# Patient Record
Sex: FEMALE | Race: BLACK OR AFRICAN AMERICAN | HISPANIC OR LATINO | Employment: UNEMPLOYED | URBAN - METROPOLITAN AREA
[De-identification: names, ages, dates, MRNs, and addresses within clinical notes are randomized per-mention and may not be internally consistent; named-entity substitution may affect disease eponyms.]

---

## 2017-02-02 ENCOUNTER — HOSPITAL ENCOUNTER (OUTPATIENT)
Dept: ULTRASOUND IMAGING | Facility: CLINIC | Age: 27
Discharge: HOME | End: 2017-02-02
Payer: COMMERCIAL

## 2017-02-02 DIAGNOSIS — B18.1 HEPATITIS B CARRIER (H): ICD-10-CM

## 2017-02-02 PROCEDURE — 76700 US EXAM ABDOM COMPLETE: CPT

## 2017-02-02 PROCEDURE — T1013 SIGN LANG/ORAL INTERPRETER: HCPCS | Mod: U3

## 2019-07-31 ENCOUNTER — OFFICE VISIT (OUTPATIENT)
Dept: FAMILY MEDICINE | Facility: CLINIC | Age: 29
End: 2019-07-31
Payer: COMMERCIAL

## 2019-07-31 VITALS
WEIGHT: 137.8 LBS | RESPIRATION RATE: 16 BRPM | DIASTOLIC BLOOD PRESSURE: 62 MMHG | BODY MASS INDEX: 22.14 KG/M2 | TEMPERATURE: 98.3 F | HEIGHT: 66 IN | HEART RATE: 63 BPM | SYSTOLIC BLOOD PRESSURE: 95 MMHG

## 2019-07-31 DIAGNOSIS — Z01.00 ENCOUNTER FOR VISUAL TESTING: Primary | ICD-10-CM

## 2019-07-31 DIAGNOSIS — Z00.00 ROUTINE HISTORY AND PHYSICAL EXAMINATION OF ADULT: ICD-10-CM

## 2019-07-31 PROCEDURE — 99202 OFFICE O/P NEW SF 15 MIN: CPT | Performed by: FAMILY MEDICINE

## 2019-07-31 ASSESSMENT — ENCOUNTER SYMPTOMS
CHILLS: 0
FREQUENCY: 0
PALPITATIONS: 0
JOINT SWELLING: 0
NAUSEA: 0
FEVER: 0
HEMATURIA: 0
MYALGIAS: 0
HEADACHES: 0
HEMATOCHEZIA: 0
DIZZINESS: 0
EYE PAIN: 0
CONSTIPATION: 0
WEAKNESS: 0
DIARRHEA: 0
DYSURIA: 0
SORE THROAT: 0
ARTHRALGIAS: 0
ABDOMINAL PAIN: 0
HEARTBURN: 0
PARESTHESIAS: 0
BREAST MASS: 0
COUGH: 0
NERVOUS/ANXIOUS: 0
SHORTNESS OF BREATH: 0

## 2019-07-31 ASSESSMENT — MIFFLIN-ST. JEOR: SCORE: 1356.81
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
## 2019-07-31 NOTE — PATIENT INSTRUCTIONS
- Make Appointment with Optometrist to have an exam done    Appointment in 1 month to have full physical exam.   - You are due for a pap smear this should be done every 3 years
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
## 2019-07-31 NOTE — PROGRESS NOTES
"   SUBJECTIVE:   CC: Wilfred Barros is an 29 year old woman who presents for preventive health visit.     Healthy Habits:     Getting at least 3 servings of Calcium per day:  Yes    Bi-annual eye exam:  Yes    Dental care twice a year:  Yes    Sleep apnea or symptoms of sleep apnea:  None    Diet:  Regular (no restrictions)    Frequency of exercise:  1 day/week    Duration of exercise:  30-45 minutes    Taking medications regularly:  Yes    PHQ-2 Total Score: 0    Additional concerns today:  No    {Add if <65 person on Medicare  - Required Questions (Optional):388624}  {Outside tests to abstract? :274362}    {additional problems to add (Optional):290214}    Today's PHQ-2 Score:   PHQ-2 ( 1999 [employer]) 7/31/2019   Q1: Little interest or pleasure in doing things 0   Q2: Feeling down, depressed or hopeless 0   PHQ-2 Score 0   Q1: Little interest or pleasure in doing things Not at all   Q2: Feeling down, depressed or hopeless Not at all   PHQ-2 Score 0       Abuse: Current or Past(Physical, Sexual or Emotional)- { :043465}  Do you feel safe in your environment? { :643551}    Social History     Tobacco Use     Smoking status: Not on file   Substance Use Topics     Alcohol use: Not on file     {Rooming Staff- Complete this question if Prescreen response is not shown below for today's visit. If you drink alcohol do you typically have >3 drinks per day or >7 drinks per week? (Optional):576979}    Alcohol Use 7/31/2019   Prescreen: >3 drinks/day or >7 drinks/week? No   {add AUDIT responses (Optional) (A score of 7 for adult men is an indication of hazardous drinking; a score of 8 or more is an indication of an alcohol use disorder.  A score of 7 or more for adult women is an indication of hazardous drinking or an alchohol use disorder):347072}    Reviewed orders with patient.  Reviewed health maintenance and updated orders accordingly - { :195801::\"Yes\"}  {Chronicprobdata (optional):277434}    {Mammo Decision Support " "(Optional):019111}    Pertinent mammograms are reviewed under the imaging tab.  History of abnormal Pap smear: { :171269}     Reviewed and updated as needed this visit by clinical staff         Reviewed and updated as needed this visit by Provider        {HISTORY OPTIONS (Optional):670103}    Review of Systems   Constitutional: Negative for chills and fever.   HENT: Negative for congestion, ear pain, hearing loss and sore throat.    Eyes: Negative for pain and visual disturbance.   Respiratory: Negative for cough and shortness of breath.    Cardiovascular: Negative for chest pain, palpitations and peripheral edema.   Gastrointestinal: Negative for abdominal pain, constipation, diarrhea, heartburn, hematochezia and nausea.   Breasts:  Negative for tenderness, breast mass and discharge.   Genitourinary: Negative for dysuria, frequency, genital sores, hematuria, pelvic pain, urgency, vaginal bleeding and vaginal discharge.   Musculoskeletal: Negative for arthralgias, joint swelling and myalgias.   Skin: Negative for rash.   Neurological: Negative for dizziness, weakness, headaches and paresthesias.   Psychiatric/Behavioral: Negative for mood changes. The patient is not nervous/anxious.      {FEMALE ROS (Optional):151094}     OBJECTIVE:   There were no vitals taken for this visit.  Physical Exam  {Exam Choices (Optional):493011}    {Diagnostic Test Results (Optional):407759::\"Diagnostic Test Results:\",\"Labs reviewed in Epic\"}    ASSESSMENT/PLAN:   {Diag Picklist:242235}    COUNSELING:  {FEMALE COUNSELING MESSAGES:231324::\"Reviewed preventive health counseling, as reflected in patient instructions\"}    There is no height or weight on file to calculate BMI.    {Weight Management Plan (ACO) Complete if BMI is abnormal-  Ages 18-64  BMI >24.9.  Age 65+ with BMI <23 or >30 (Optional):534711}     has no tobacco history on file.  {Tobacco Cessation -- Complete if patient is a smoker (Optional):127157}    Counseling " Resources:  ATP IV Guidelines  Pooled Cohorts Equation Calculator  Breast Cancer Risk Calculator  FRAX Risk Assessment  ICSI Preventive Guidelines  Dietary Guidelines for Americans, 2010  USDA's MyPlate  ASA Prophylaxis  Lung CA Screening    Bryson Wilson MD  [facility]

## 2019-12-08 ENCOUNTER — APPOINTMENT (OUTPATIENT)
Dept: ULTRASOUND IMAGING | Facility: CLINIC | Age: 29
End: 2019-12-08
Attending: EMERGENCY MEDICINE
Payer: COMMERCIAL

## 2019-12-08 ENCOUNTER — HOSPITAL ENCOUNTER (EMERGENCY)
Facility: CLINIC | Age: 29
Discharge: HOME | End: 2019-12-08
Attending: EMERGENCY MEDICINE | Admitting: EMERGENCY MEDICINE
Payer: COMMERCIAL

## 2019-12-08 VITALS
HEART RATE: 69 BPM | WEIGHT: 143.4 LBS | SYSTOLIC BLOOD PRESSURE: 97 MMHG | TEMPERATURE: 97.1 F | BODY MASS INDEX: 23.15 KG/M2 | OXYGEN SATURATION: 100 % | DIASTOLIC BLOOD PRESSURE: 45 MMHG | RESPIRATION RATE: 12 BRPM

## 2019-12-08 DIAGNOSIS — N93.9 VAGINAL BLEEDING: ICD-10-CM

## 2019-12-08 DIAGNOSIS — R10.2 PELVIC PAIN IN FEMALE: ICD-10-CM

## 2019-12-08 LAB
SPECIMEN SOURCE: NORMAL
WET PREP SPEC: NORMAL

## 2019-12-08 PROCEDURE — 87591 N.GONORRHOEAE DNA AMP PROB: CPT | Performed by: EMERGENCY MEDICINE

## 2019-12-08 PROCEDURE — 87491 CHLMYD TRACH DNA AMP PROBE: CPT | Performed by: EMERGENCY MEDICINE

## 2019-12-08 PROCEDURE — 87210 SMEAR WET MOUNT SALINE/INK: CPT | Performed by: EMERGENCY MEDICINE

## 2019-12-08 PROCEDURE — 25000132 ZZH RX MED GY IP 250 OP 250 PS 637: Performed by: EMERGENCY MEDICINE

## 2019-12-08 PROCEDURE — 99284 EMERGENCY DEPT VISIT MOD MDM: CPT | Mod: Z6 | Performed by: EMERGENCY MEDICINE

## 2019-12-08 PROCEDURE — 99284 EMERGENCY DEPT VISIT MOD MDM: CPT | Mod: 25

## 2019-12-08 PROCEDURE — 93976 VASCULAR STUDY: CPT

## 2019-12-08 RX ORDER — NORETHINDRONE ACETATE AND ETHINYL ESTRADIOL .03; 1.5 MG/1; MG/1
TABLET ORAL
COMMUNITY
Start: 2019-11-22

## 2019-12-08 RX ORDER — IBUPROFEN 600 MG/1
600 TABLET, FILM COATED ORAL ONCE
Status: COMPLETED | OUTPATIENT
Start: 2019-12-08 | End: 2019-12-08

## 2019-12-08 RX ORDER — COPPER 313.4 MG/1
1 INTRAUTERINE DEVICE INTRAUTERINE
COMMUNITY
Start: 2019-09-18 | End: 2029-09-15

## 2019-12-08 RX ADMIN — IBUPROFEN 600 MG: 600 TABLET ORAL at 10:16

## 2019-12-08 ASSESSMENT — ENCOUNTER SYMPTOMS
ABDOMINAL PAIN: 1
SHORTNESS OF BREATH: 0
CHOKING: 0
FEVER: 0
VOMITING: 0
DYSURIA: 0
NAUSEA: 1

## 2019-12-08 NOTE — ED TRIAGE NOTES
Extreme abdominal pain for past four days. Went to urgent care yesterday 0800; gave Ibuprofen.  Last night large 1900 large blood clot was removed from body and continues to have extremely abdominal pain. Pt has video/photos of clot removed.

## 2019-12-08 NOTE — ED PROVIDER NOTES
"    [facility] EMERGENCY DEPARTMENT ([facility])    19      History     Chief Complaint   Patient presents with     Abdominal Pain     HPI  Wilfred rivera is an otherwise healthy 29-year-old female who is S/P  section who presents to the Emergency Department for evaluation of abdominal pain.  Patient states that her menstrual cycle began 3 days ago, and that is when her abdominal pain started.  Patient reports a \"large\" blood clot coming out last night.  Other than the clot, the patient only complains of very light vaginal bleeding compared to her other periods.  Patient also states that she has been experiencing some lower diffuse abdominal pain.  Patient describes this as a \"cramping\" pain below the bellybutton.  Patient reports that her usual periods began the - of the month, but it was late and lighter than usual.  Patient also reports history of recent ultrasound with a finding of an ovarian cyst (right ovary).  The patient was prescribed birth control (oral) because of the cyst.  Patient states that she still has her IUD in as well.  The patient reports taking this birth control for the last 10 days.  Patient reports her current pain is a \"7-8 out of 10\" and states she took 1 pill of ibuprofen at 6 AM this morning.  Denies any dysuria, chest pain, shortness of breath, vomiting.  Patient does endorse nausea.  Patient also denies any history of appendectomy or cholecystectomy.  Patient had a negative pregnancy test at urgent care yesterday as well as negative urinalysis.    The patient's most recent ultrasound was on 2019 with the comments and results posted below.    GYN Ultrasound  Impression:  Normal appearing uterus with endometrial cavity measuring     12.8 mm.  IUD is seen properly positioned within the uterine cavity.      Right ovary again has two echogenic, ground-glass appearing areas with no     blood flow by color Doppler.  The larger of the echogenic areas may be   "   slightly bigger than that measured on the ultrasound dated September 18th, 2019. Also, the overall size of the right ovary is slightly larger.  Left     ovary is within normal limits.          Plan: These findings were reviewed with the patient. She will follow up     with referring provider.      History reviewed. No pertinent past medical history.    History reviewed. No pertinent surgical history.    No family history on file.    Social History     Tobacco Use     Smoking status: Not on file     Smokeless tobacco: Never Used   Substance Use Topics     Alcohol use: Not Currently       No current facility-administered medications for this encounter.      Current Outpatient Medications   Medication     norethindrone-ethinyl estradiol (MICROGESTIN 1.5/30) 1.5-30 MG-MCG tablet     paragard intrauterine copper device      No Known Allergies    I have reviewed the Medications, Allergies, Past Medical and Surgical History, and Social History in the Epic system.    Review of Systems   Constitutional: Negative for fever.   Respiratory: Negative for choking and shortness of breath.    Cardiovascular: Negative for chest pain.   Gastrointestinal: Positive for abdominal pain and nausea. Negative for vomiting.   Genitourinary: Positive for vaginal bleeding. Negative for dysuria.   Skin: Negative for rash.       Physical Exam     ED Triage Vitals [12/08/19 0931]   Enc Vitals Group      BP 97/45      Pulse 69      Resp 12      Temp 97.1  F (36.2  C)      Temp src Oral      SpO2 100 %      Weight 65 kg (143 lb 6.4 oz)      Height          Physical Exam  GEN:  Well developed, no acute distress  HEENT:  EOMI, Mucous membranes are moist.   Cardio:  RRR, no murmur  PULM:  Lungs clear, good air movement, no wheezes, rales  Abd:  Soft, normal bowel sounds, there is suprapubic tenderness, no percussion tenderness  Pelvic Exam: There is a small amount of blood seen in the vagina, there appears to be a dark tissue like substance on  the cervix that I was not able to remove with ringed forceps.  IUD strings are seen.  There is also a foul smell with speculum exam, no discharge.    Musculoskeletal:  normal range of motion, no lower extremity swelling or calf tenderness  Neuro:  Alert and oriented X3, Follows commands, moving all extremities spontaneously   Skin:  Warm, dry    ED Course   10:03 AM  The patient was seen and examined by Jennifer Durbin MD in Room ED05.        Procedures           Critical Care time:  none  She was given ibuprofen for pain.     All labs are normal except as shown  Labs Ordered and Resulted from Time of ED Arrival Up to the Time of Departure from the ED   WET PREP   NEISSERIA GONORRHOEAE PCR   CHLAMYDIA TRACHOMATIS PCR     Results for orders placed or performed during the hospital encounter of 12/08/19 (from the past 24 hour(s))   Wet prep   Result Value Ref Range    Specimen Description Cervix Vagina     Wet Prep Few  PMNs seen       Wet Prep No motile Trichomonas seen     Wet Prep No yeast seen     Wet Prep No clue cells seen    US Pelvis Cmplt w Transvag & Doppler LmtPel Duplex Limited    Narrative    US PELVIS COMPLETE WITH TRANSVAGINAL AND DOPPLER LIMITED    12/8/2019  11:32 AM     HISTORY: Pelvic pain.    TECHNIQUE: Transvaginal imaging was added to the transabdominal scans.    COMPARISON: None.    FINDINGS: The uterus is measured at 9.3 x 5.7 x 4.6 cm. No fibroids  are evident. IUD appears to be in good position within the  endometrium. Endometrial stripe is not well seen due to the presence  of the IUD, but appears thickened where visualized, measuring up to 2  cm. The right ovary contains two isoechoic probable complex cystic  lesions, with the largest measuring 2.8 cm. The left ovary is  unremarkable. No solid adnexal masses are identified. Small amount of  anechoic free pelvic fluid is present. Color Doppler blood flow is  noted within the ovaries bilaterally.      Impression    IMPRESSION:   1. IUD  appears to be in good position within the endometrium.  2. Endometrium appears thickened at 2 cm.    3. Two complex cystic lesions are noted in the right ovary, with the  largest measuring 2.8 cm. Follow-up ultrasound in 6-12 weeks may be  helpful to ensure resolution  4. Small amount of free fluid in the pelvis is nonspecific, and may be  physiologic.     The patient's physical exam findings on speculum exam were discussed with gynecology over the phone.  Given that she had a normal Pap smear in 2018, the did not recommend any further testing today other than the ultrasound of the pelvis for pain.  Patient was given ibuprofen in the ED and had improvement in her pain.    Assessments & Plan (with Medical Decision Making)   Patient presents with lower abdominal pain and vaginal bleeding.  Speculum exam revealed a tissue like substance over the cervix that I could not remove.  Not sure what the tissue is.  She had a negative pregnancy test yesterday.  Ultrasound is revealing a similar size of the right ovarian cyst as compared to previous ultrasound.  No sign of abscess or ovarian torsion.  Patient is otherwise stable, pain is improved, abdominal exam is not concerning for surgical abdomen.  She was advised to take ibuprofen as needed for her pain and follow-up with her gynecologist for reevaluation and for continued treatment.  I also advised the patient to return to the emergency department if she has fever, vomiting, worsening pain or any other concerns.    I have reviewed the nursing notes.    I have reviewed the findings, diagnosis, plan and need for follow up with the patient.    Discharge Medication List as of 12/8/2019  1:01 PM          Final diagnoses:   Pelvic pain in female   Vaginal bleeding   ILars, am serving as a trained medical scribe to document services personally performed by Jennifer Durbin MD, based on the provider's statements to me.      Jennifer CARDONA MD, was physically  present and have reviewed and verified the accuracy of this note documented by Lars Seals.     12/8/2019   [facility], [place], EMERGENCY DEPARTMENT     Jennifer Durbin MD  12/08/19 6083

## 2019-12-08 NOTE — ED TRIAGE NOTES
Pt started birth control 10 days ago. Was informed to start when period began.  Saw some spotting 3 days ago and began birth control.

## 2019-12-08 NOTE — DISCHARGE INSTRUCTIONS
Please make an appointment to follow up with your gynecologist in 3-4 days for further evaluation and treatment.  Continue Ibuprofen and Tylenol if needed for pain. Return to the Emergency Department if you have fever, vomiting, worsening pain or other concerns.

## 2019-12-08 NOTE — ED AVS SNAPSHOT
[facility], [place], Emergency Department  [address]  [facility] [address]  Phone:  [phone]  Fax:  [phone]                                    Wilfred Barros   MRN: [id number]    Department:  [facility], Emergency Department   Date of Visit:  12/8/2019           After Visit Summary Signature Page    I have received my discharge instructions, and my questions have been answered. I have discussed any challenges I see with this plan with the nurse or doctor.    ..........................................................................................................................................  Patient/Patient Representative Signature      ..........................................................................................................................................  Patient Representative Print Name and Relationship to Patient    ..................................................               ................................................  Date                                   Time    ..........................................................................................................................................  Reviewed by Signature/Title    ...................................................              ..............................................  Date                                               Time          22EPIC Rev 08/18

## 2019-12-09 LAB
C TRACH DNA SPEC QL NAA+PROBE: NEGATIVE
N GONORRHOEA DNA SPEC QL NAA+PROBE: NEGATIVE
SPECIMEN SOURCE: NORMAL
SPECIMEN SOURCE: NORMAL

## 2019-12-09 NOTE — RESULT ENCOUNTER NOTE
Final result for both N. Gonorrhoeae PCR and Chlamydia Trachomatis PCR are NEGATIVE.  No treatment or change in treatment per [place] ED Lab Result protocol.

## 2022-11-08 ENCOUNTER — HOSPITAL ENCOUNTER (EMERGENCY)
Facility: CLINIC | Age: 32
Discharge: HOME | End: 2022-11-08
Attending: EMERGENCY MEDICINE | Admitting: EMERGENCY MEDICINE
Payer: COMMERCIAL

## 2022-11-08 VITALS
TEMPERATURE: 98.7 F | SYSTOLIC BLOOD PRESSURE: 103 MMHG | HEIGHT: 66 IN | WEIGHT: 142 LBS | BODY MASS INDEX: 22.82 KG/M2 | HEART RATE: 83 BPM | DIASTOLIC BLOOD PRESSURE: 60 MMHG | OXYGEN SATURATION: 100 % | RESPIRATION RATE: 16 BRPM

## 2022-11-08 DIAGNOSIS — O21.0 HYPEREMESIS GRAVIDARUM: ICD-10-CM

## 2022-11-08 LAB
ALBUMIN UR-MCNC: NEGATIVE MG/DL
ANION GAP SERPL CALCULATED.3IONS-SCNC: 9 MMOL/L (ref 3–14)
APPEARANCE UR: CLEAR
BACTERIA #/AREA URNS HPF: ABNORMAL /HPF
BILIRUB UR QL STRIP: NEGATIVE
BUN SERPL-MCNC: 8 MG/DL (ref 7–30)
CALCIUM SERPL-MCNC: 10.3 MG/DL (ref 8.5–10.1)
CHLORIDE BLD-SCNC: 101 MMOL/L (ref 94–109)
CLUE CELLS: ABNORMAL
CO2 SERPL-SCNC: 26 MMOL/L (ref 20–32)
COLOR UR AUTO: ABNORMAL
CREAT SERPL-MCNC: 0.5 MG/DL (ref 0.52–1.04)
GFR SERPL CREATININE-BSD FRML MDRD: >90 ML/MIN/1.73M2
GLUCOSE BLD-MCNC: 96 MG/DL (ref 70–99)
GLUCOSE UR STRIP-MCNC: >=1000 MG/DL
HGB UR QL STRIP: NEGATIVE
KETONES UR STRIP-MCNC: 10 MG/DL
LEUKOCYTE ESTERASE UR QL STRIP: NEGATIVE
NITRATE UR QL: NEGATIVE
PH UR STRIP: 6 [PH] (ref 5–7)
POTASSIUM BLD-SCNC: 3.7 MMOL/L (ref 3.4–5.3)
RBC URINE: 1 /HPF
SODIUM SERPL-SCNC: 136 MMOL/L (ref 133–144)
SP GR UR STRIP: 1.03 (ref 1–1.03)
SQUAMOUS EPITHELIAL: 13 /HPF
TRICHOMONAS, WET PREP: ABNORMAL
UROBILINOGEN UR STRIP-MCNC: NORMAL MG/DL
WBC URINE: 5 /HPF
WBC'S/HIGH POWER FIELD, WET PREP: ABNORMAL
YEAST, WET PREP: PRESENT

## 2022-11-08 PROCEDURE — 250N000011 HC RX IP 250 OP 636: Performed by: EMERGENCY MEDICINE

## 2022-11-08 PROCEDURE — 99284 EMERGENCY DEPT VISIT MOD MDM: CPT | Mod: 25 | Performed by: EMERGENCY MEDICINE

## 2022-11-08 PROCEDURE — 258N000003 HC RX IP 258 OP 636: Performed by: EMERGENCY MEDICINE

## 2022-11-08 PROCEDURE — 99284 EMERGENCY DEPT VISIT MOD MDM: CPT | Performed by: EMERGENCY MEDICINE

## 2022-11-08 PROCEDURE — 250N000011 HC RX IP 250 OP 636

## 2022-11-08 PROCEDURE — 36415 COLL VENOUS BLD VENIPUNCTURE: CPT | Performed by: EMERGENCY MEDICINE

## 2022-11-08 PROCEDURE — 96374 THER/PROPH/DIAG INJ IV PUSH: CPT | Performed by: EMERGENCY MEDICINE

## 2022-11-08 PROCEDURE — 87210 SMEAR WET MOUNT SALINE/INK: CPT | Performed by: EMERGENCY MEDICINE

## 2022-11-08 PROCEDURE — 96361 HYDRATE IV INFUSION ADD-ON: CPT | Performed by: EMERGENCY MEDICINE

## 2022-11-08 PROCEDURE — 80048 BASIC METABOLIC PNL TOTAL CA: CPT | Performed by: EMERGENCY MEDICINE

## 2022-11-08 PROCEDURE — 81001 URINALYSIS AUTO W/SCOPE: CPT | Performed by: EMERGENCY MEDICINE

## 2022-11-08 RX ORDER — CLOTRIMAZOLE 1 %
1 CREAM WITH APPLICATOR VAGINAL AT BEDTIME
Qty: 45 G | Refills: 0 | Status: SHIPPED | OUTPATIENT
Start: 2022-11-08 | End: 2022-11-15

## 2022-11-08 RX ORDER — ONDANSETRON 2 MG/ML
8 INJECTION INTRAMUSCULAR; INTRAVENOUS ONCE
Status: COMPLETED | OUTPATIENT
Start: 2022-11-08 | End: 2022-11-08

## 2022-11-08 RX ORDER — ONDANSETRON 4 MG/1
4 TABLET, ORALLY DISINTEGRATING ORAL EVERY 8 HOURS PRN
Qty: 20 TABLET | Refills: 3 | Status: SHIPPED | OUTPATIENT
Start: 2022-11-08

## 2022-11-08 RX ORDER — PNV NO.95/FERROUS FUM/FOLIC AC 28MG-0.8MG
1 TABLET ORAL DAILY
COMMUNITY
Start: 2022-01-31

## 2022-11-08 RX ORDER — ONDANSETRON 2 MG/ML
INJECTION INTRAMUSCULAR; INTRAVENOUS
Status: COMPLETED
Start: 2022-11-08 | End: 2022-11-08

## 2022-11-08 RX ADMIN — DEXTROSE AND SODIUM CHLORIDE: 5; 900 INJECTION, SOLUTION INTRAVENOUS at 10:41

## 2022-11-08 RX ADMIN — ONDANSETRON 8 MG: 2 INJECTION INTRAMUSCULAR; INTRAVENOUS at 10:41

## 2022-11-08 RX ADMIN — DEXTROSE AND SODIUM CHLORIDE: 5; 900 INJECTION, SOLUTION INTRAVENOUS at 12:03

## 2022-11-08 RX ADMIN — ONDANSETRON 4 MG: 2 INJECTION INTRAMUSCULAR; INTRAVENOUS at 12:04

## 2022-11-08 ASSESSMENT — ENCOUNTER SYMPTOMS
APPETITE CHANGE: 1
EYE REDNESS: 0
SHORTNESS OF BREATH: 0
CONFUSION: 0
COLOR CHANGE: 0
FEVER: 0
ARTHRALGIAS: 0
ABDOMINAL PAIN: 1
FATIGUE: 1
HEADACHES: 0
VOMITING: 1
DIFFICULTY URINATING: 0
NAUSEA: 1
NECK STIFFNESS: 0

## 2022-11-08 ASSESSMENT — ACTIVITIES OF DAILY LIVING (ADL)
ADLS_ACUITY_SCORE: 35
ADLS_ACUITY_SCORE: 35

## 2022-11-08 NOTE — DISCHARGE INSTRUCTIONS
Please make an appointment to follow up with your OB clinic.    Zofran, as directed, if needed for nausea and/or vomiting.    Try to eat small, frequent meals.    Clotrimazole vaginal cream for vaginal yeast infection.    Return to the emergency department for any problems.

## 2022-11-08 NOTE — ED TRIAGE NOTES
Patient presents 9 weeks pregnant with hyperemesis. Patient has been unable to eat or drink anything. Patient reports symptoms for the last month, worsening over the last 3 days. Patient reports 9/10 burning pain due to vomiting.      Triage Assessment     Row Name 11/08/22 1020       Triage Assessment (Adult)    Airway WDL WDL       Respiratory WDL    Respiratory WDL WDL       Skin Circulation/Temperature WDL    Skin Circulation/Temperature WDL WDL       Cardiac WDL    Cardiac WDL X;chest pain       Chest Pain Assessment    Chest Pain Location midsternal    Chest Pain Radiation abdomen    Character burning    Precipitating Factors at rest    Associated Signs/Symptoms nausea;vomiting    Chest Pain Intervention 12-lead ECG obtained       Peripheral/Neurovascular WDL    Peripheral Neurovascular WDL WDL

## 2022-11-08 NOTE — ED PROVIDER NOTES
[facility] EMERGENCY DEPARTMENT ([facility])    22          History     Chief Complaint   Patient presents with     Nausea & Vomiting     Patient presents 9 weeks pregnant with hyperemesis. Patient has been unable to eat or drink anything. Patient reports symptoms for the last month, worsening over the last 3 days.      The history is provided by the patient and medical records.     Wilfred Barros is a 32 year old  pregnant (approximately 9 weeks gestation) female with past medical history significant for chronic hepatitis B, pituitary tumor s/p resection, s/p  who presents to the ED for evaluation of nausea and vomiting for 3 days.  The patient reports that this is her fourth pregnancy and she has 3 children at home.  She did not have hyperemesis with previous pregnancies.  She reports she is not on nausea medications.  She reports she has not been able to eat much in the last 4 days.  She also feels burning epigastric abdominal pain and fatigue.  She has a virtual appointment with her OB/GYN tomorrow.  Patient also does complain of vaginal itching and a whitish vaginal discharge without pain.    Past Medical History  History reviewed. No pertinent past medical history.  History reviewed. No pertinent surgical history.  cholecalciferol (VITAMIN D3) 10 mcg (400 units) TABS tablet  clotrimazole (LOTRIMIN) 1 % vaginal cream  norethindrone-ethinyl estradiol (MICROGESTIN 1.5/30) 1.5-30 MG-MCG tablet  ondansetron (ZOFRAN ODT) 4 MG ODT tab  paragard intrauterine copper device  Prenatal Vit-Fe Fumarate-FA (PRENATAL VITAMINS) 28-0.8 MG TABS      Allergies   Allergen Reactions     Metronidazole Angioedema and Swelling     Also dizzy and hard to swallow  Other reaction(s): Swelling of Face,throat,lips or tongue/Angioedema - Allergy  Also dizzy and hard to swallow  Also dizzy and hard to swallow  Also dizzy and hard to swallow       Blood-Group Specific Substance Other (See Comments)     Patient  "has a probable passive anti-D antibody. Blood products may be delayed. Draw patient 24 hours prior to transfusion. Draw one red top and two purple top tubes for all type and screen orders.  Patient has a probable passive anti-D antibody. Blood products may be delayed. Draw patient 24 hours prior to transfusion. Draw one red top and two purple top tubes for all type and screen orders.       Family History  History reviewed. No pertinent family history.  Social History   Social History     Tobacco Use     Smoking status: Never     Smokeless tobacco: Never   Substance Use Topics     Alcohol use: Not Currently     Drug use: Not Currently      Past medical history, past surgical history, medications, allergies, family history, and social history were reviewed with the patient. No additional pertinent items.       Review of Systems   Constitutional: Positive for appetite change and fatigue. Negative for fever.   HENT: Negative for congestion.    Eyes: Negative for redness.   Respiratory: Negative for shortness of breath.    Cardiovascular: Negative for chest pain.   Gastrointestinal: Positive for abdominal pain, nausea and vomiting.   Genitourinary: Negative for difficulty urinating.   Musculoskeletal: Negative for arthralgias and neck stiffness.   Skin: Negative for color change.   Neurological: Negative for headaches.   Psychiatric/Behavioral: Negative for confusion.   All other systems reviewed and are negative.    A complete review of systems was performed with pertinent positives and negatives noted in the HPI, and all other systems negative.    Physical Exam   BP: 113/75  Pulse: 80  Temp: 98.7  F (37.1  C)  Resp: 18  Height: 167.6 cm (5' 6\")  Weight: 64.4 kg (142 lb)  SpO2: 100 %  Physical Exam  Vitals and nursing note reviewed.   Constitutional:       General: She is not in acute distress.     Appearance: She is well-developed. She is not ill-appearing, toxic-appearing or diaphoretic.   HENT:      Head: " Normocephalic and atraumatic.      Mouth/Throat:      Lips: Pink.      Mouth: Mucous membranes are moist.      Pharynx: Oropharynx is clear. No oropharyngeal exudate.   Eyes:      General: Lids are normal. No scleral icterus.     Extraocular Movements: Extraocular movements intact.      Right eye: No nystagmus.      Left eye: No nystagmus.      Conjunctiva/sclera: Conjunctivae normal.      Pupils: Pupils are equal, round, and reactive to light.   Neck:      Thyroid: No thyromegaly.      Vascular: No JVD.      Trachea: No tracheal deviation.   Cardiovascular:      Rate and Rhythm: Normal rate and regular rhythm.      Pulses: Normal pulses.      Heart sounds: Normal heart sounds. No murmur heard.    No friction rub. No gallop.   Pulmonary:      Effort: Pulmonary effort is normal. No respiratory distress.      Breath sounds: Normal breath sounds.   Abdominal:      General: Bowel sounds are normal. There is no distension.      Palpations: Abdomen is soft. There is no mass.      Tenderness: There is no abdominal tenderness. There is no guarding or rebound.   Musculoskeletal:         General: No tenderness. Normal range of motion.      Cervical back: Normal range of motion and neck supple. No erythema or rigidity.      Right lower leg: No edema.      Left lower leg: No edema.   Lymphadenopathy:      Cervical: No cervical adenopathy.   Skin:     General: Skin is warm and dry.      Capillary Refill: Capillary refill takes less than 2 seconds.      Coloration: Skin is not pale.      Findings: No erythema or rash.   Neurological:      Mental Status: She is alert and oriented to person, place, and time.      Cranial Nerves: No cranial nerve deficit.      Sensory: No sensory deficit.      Motor: Motor function is intact.   Psychiatric:         Mood and Affect: Mood and affect normal.         Speech: Speech normal.         Behavior: Behavior normal.         ED Course     10:27 AM  The patient was seen and examined by Seth  Houston Ledbetter MD in Triage.     Procedures                     Results for orders placed or performed during the hospital encounter of 11/08/22   Basic metabolic panel     Status: Abnormal   Result Value Ref Range    Sodium 136 133 - 144 mmol/L    Potassium 3.7 3.4 - 5.3 mmol/L    Chloride 101 94 - 109 mmol/L    Carbon Dioxide (CO2) 26 20 - 32 mmol/L    Anion Gap 9 3 - 14 mmol/L    Urea Nitrogen 8 7 - 30 mg/dL    Creatinine 0.50 (L) 0.52 - 1.04 mg/dL    Calcium 10.3 (H) 8.5 - 10.1 mg/dL    Glucose 96 70 - 99 mg/dL    GFR Estimate >90 >60 mL/min/1.73m2   UA with Microscopic reflex to Culture     Status: Abnormal    Specimen: Urine, Clean Catch   Result Value Ref Range    Color Urine Light Yellow Colorless, Straw, Light Yellow, Yellow    Appearance Urine Clear Clear    Glucose Urine >=1000 (A) Negative mg/dL    Bilirubin Urine Negative Negative    Ketones Urine 10 (A) Negative mg/dL    Specific Gravity Urine 1.032 1.003 - 1.035    Blood Urine Negative Negative    pH Urine 6.0 5.0 - 7.0    Protein Albumin Urine Negative Negative mg/dL    Urobilinogen Urine Normal Normal, 2.0 mg/dL    Nitrite Urine Negative Negative    Leukocyte Esterase Urine Negative Negative    Bacteria Urine Few (A) None Seen /HPF    RBC Urine 1 <=2 /HPF    WBC Urine 5 <=5 /HPF    Squamous Epithelials Urine 13 (H) <=1 /HPF    Narrative    Urine Culture not indicated   Wet prep     Status: Abnormal    Specimen: Vagina; Swab   Result Value Ref Range    Trichomonas Absent Absent    Yeast Present (A) Absent    Clue Cells Absent Absent    WBCs/high power field 1+ (A) None     Medications   dextrose 5% and 0.9% NaCl infusion (0 mLs Intravenous Stopped 11/8/22 1331)   ondansetron (ZOFRAN) injection 8 mg (8 mg Intravenous Given 11/8/22 1041)   ondansetron (ZOFRAN) 2 MG/ML injection (4 mg  Given 11/8/22 1204)        Assessments & Plan (with Medical Decision Making)     Patient presented to the emergency department with nausea and vomiting in the setting of  early pregnancy.  This seems consistent with hyperemesis gravidarum.  Abdominal exam is benign, patient is afebrile and does not appear in acute distress all decreasing suspicion for intra-abdominal pathology.  She has no complaints that would suggest ectopic pregnancy or miscarriage.  She did complain of some vaginal itching and whitish discharge.  We had no rooms in the emergency department so I was unable to perform a pelvic exam on the patient in an appropriately private setting so patient did self swab and wet prep came back positive for yeast.  She will be treated with topical antifungals.  She did receive 2 L of IV fluid as well as Zofran and felt improved.  Abdominal exam remained benign.  Patient will be discharged with a prescription for Zofran and was asked to follow-up with an OB provider as soon as possible.    I have reviewed the nursing notes. I have reviewed the findings, diagnosis, plan and need for follow up with the patient.    New Prescriptions    CLOTRIMAZOLE (LOTRIMIN) 1 % VAGINAL CREAM    Place 1 Applicatorful vaginally At Bedtime for 7 days    ONDANSETRON (ZOFRAN ODT) 4 MG ODT TAB    Take 1 tablet (4 mg) by mouth every 8 hours as needed for nausea       Final diagnoses:   Hyperemesis gravidarum     I, Kavitha Alicia, am serving as a trained medical scribe to document services personally performed by Seth Ledbetter MD based on the provider's statements to me on November 8, 2022.  This document has been checked and approved by the attending provider.    ISeth MD, was physically present and have reviewed and verified the accuracy of this note documented by Kavitha Alicia, medical scribe.      --  Seth Ledbetter  [facility] EMERGENCY DEPARTMENT  11/8/2022     Seth Ledbetter MD  11/08/22 5511

## 2022-11-11 ENCOUNTER — HOSPITAL ENCOUNTER (EMERGENCY)
Facility: CLINIC | Age: 32
Discharge: HOME | End: 2022-11-11
Attending: FAMILY MEDICINE | Admitting: FAMILY MEDICINE
Payer: COMMERCIAL

## 2022-11-11 ENCOUNTER — APPOINTMENT (OUTPATIENT)
Dept: ULTRASOUND IMAGING | Facility: CLINIC | Age: 32
End: 2022-11-11
Attending: FAMILY MEDICINE
Payer: COMMERCIAL

## 2022-11-11 VITALS
DIASTOLIC BLOOD PRESSURE: 74 MMHG | TEMPERATURE: 98.7 F | SYSTOLIC BLOOD PRESSURE: 105 MMHG | RESPIRATION RATE: 16 BRPM | HEART RATE: 70 BPM | OXYGEN SATURATION: 100 %
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
## 2022-11-11 DIAGNOSIS — O21.9 NAUSEA AND VOMITING IN PREGNANCY: ICD-10-CM

## 2022-11-11 DIAGNOSIS — O20.9 BLEEDING IN EARLY PREGNANCY: ICD-10-CM

## 2022-11-11 LAB
ANION GAP SERPL CALCULATED.3IONS-SCNC: 10 MMOL/L (ref 3–14)
B-HCG SERPL-ACNC: ABNORMAL IU/L (ref 0–5)
BASOPHILS # BLD AUTO: 0 10E3/UL (ref 0–0.2)
BASOPHILS NFR BLD AUTO: 0 %
BUN SERPL-MCNC: 9 MG/DL (ref 7–30)
CALCIUM SERPL-MCNC: 9.1 MG/DL (ref 8.5–10.1)
CHLORIDE BLD-SCNC: 106 MMOL/L (ref 94–109)
CO2 SERPL-SCNC: 23 MMOL/L (ref 20–32)
CREAT SERPL-MCNC: 0.54 MG/DL (ref 0.52–1.04)
EOSINOPHIL # BLD AUTO: 0.1 10E3/UL (ref 0–0.7)
EOSINOPHIL NFR BLD AUTO: 1 %
ERYTHROCYTE [DISTWIDTH] IN BLOOD BY AUTOMATED COUNT: 11.9 % (ref 10–15)
GFR SERPL CREATININE-BSD FRML MDRD: >90 ML/MIN/1.73M2
GLUCOSE BLD-MCNC: 88 MG/DL (ref 70–99)
HCT VFR BLD AUTO: 35.7 % (ref 35–47)
HGB BLD-MCNC: 12.7 G/DL (ref 11.7–15.7)
IMM GRANULOCYTES # BLD: 0 10E3/UL
IMM GRANULOCYTES NFR BLD: 0 %
LYMPHOCYTES # BLD AUTO: 2.4 10E3/UL (ref 0.8–5.3)
LYMPHOCYTES NFR BLD AUTO: 41 %
MCH RBC QN AUTO: 31.1 PG (ref 26.5–33)
MCHC RBC AUTO-ENTMCNC: 35.6 G/DL (ref 31.5–36.5)
MCV RBC AUTO: 87 FL (ref 78–100)
MONOCYTES # BLD AUTO: 0.7 10E3/UL (ref 0–1.3)
MONOCYTES NFR BLD AUTO: 11 %
NEUTROPHILS # BLD AUTO: 2.7 10E3/UL (ref 1.6–8.3)
NEUTROPHILS NFR BLD AUTO: 47 %
NRBC # BLD AUTO: 0 10E3/UL
NRBC BLD AUTO-RTO: 0 /100
PLATELET # BLD AUTO: 184 10E3/UL (ref 150–450)
POTASSIUM BLD-SCNC: 3.7 MMOL/L (ref 3.4–5.3)
RBC # BLD AUTO: 4.09 10E6/UL (ref 3.8–5.2)
SODIUM SERPL-SCNC: 139 MMOL/L (ref 133–144)
WBC # BLD AUTO: 5.8 10E3/UL (ref 4–11)

## 2022-11-11 PROCEDURE — 36415 COLL VENOUS BLD VENIPUNCTURE: CPT | Performed by: FAMILY MEDICINE

## 2022-11-11 PROCEDURE — 93005 ELECTROCARDIOGRAM TRACING: CPT | Performed by: FAMILY MEDICINE

## 2022-11-11 PROCEDURE — 80048 BASIC METABOLIC PNL TOTAL CA: CPT | Performed by: FAMILY MEDICINE

## 2022-11-11 PROCEDURE — 85025 COMPLETE CBC W/AUTO DIFF WBC: CPT | Performed by: FAMILY MEDICINE

## 2022-11-11 PROCEDURE — 76801 OB US < 14 WKS SINGLE FETUS: CPT

## 2022-11-11 PROCEDURE — 93010 ELECTROCARDIOGRAM REPORT: CPT | Performed by: FAMILY MEDICINE

## 2022-11-11 PROCEDURE — 258N000003 HC RX IP 258 OP 636: Performed by: FAMILY MEDICINE

## 2022-11-11 PROCEDURE — 84702 CHORIONIC GONADOTROPIN TEST: CPT | Performed by: FAMILY MEDICINE

## 2022-11-11 PROCEDURE — 96360 HYDRATION IV INFUSION INIT: CPT | Performed by: FAMILY MEDICINE

## 2022-11-11 PROCEDURE — 99285 EMERGENCY DEPT VISIT HI MDM: CPT | Mod: 25 | Performed by: FAMILY MEDICINE

## 2022-11-11 RX ORDER — SODIUM CHLORIDE, SODIUM LACTATE, POTASSIUM CHLORIDE, CALCIUM CHLORIDE 600; 310; 30; 20 MG/100ML; MG/100ML; MG/100ML; MG/100ML
125 INJECTION, SOLUTION INTRAVENOUS CONTINUOUS
Status: DISCONTINUED | OUTPATIENT
Start: 2022-11-11 | End: 2022-11-11 | Stop reason: HOSPADM

## 2022-11-11 RX ADMIN — SODIUM CHLORIDE, POTASSIUM CHLORIDE, SODIUM LACTATE AND CALCIUM CHLORIDE 1000 ML: 600; 310; 30; 20 INJECTION, SOLUTION INTRAVENOUS at 17:11

## 2022-11-11 ASSESSMENT — ACTIVITIES OF DAILY LIVING (ADL): ADLS_ACUITY_SCORE: 35

## 2022-11-11 NOTE — ED PROVIDER NOTES
[facility] [place] ([facility])     2022     History     Chief Complaint   Patient presents with     Vaginal Bleeding     Nausea & Vomiting     HPI  Wilfred Barros is a 32 year old 10 weeks pregnant  female with a past medical history including s/p , history of premature delivery, pituitary macroadenoma who presents to the Emergency Department for evaluation of vaginal bleeding, nausea, and vomiting.  The patient reports that yesterday, she was having bad morning sickness and started to feel dizzy.she has been fell and landed on her right hip.  Today, she has had vaginal bleeding along with some abdominal cramping and back pain.  She also complains of nausea and vomiting.  She states that she takes Zofran for her morning sickness.  She is able to ambulate.  Patient has had 3 prior pregnancies and has 3 living children.  She also states she was recently diagnosed with yeast vaginitis and is using cream.    Patient is known to be Rh+.      Past Medical History  No past medical history on file.  No past surgical history on file.  cholecalciferol (VITAMIN D3) 10 mcg (400 units) TABS tablet  clotrimazole (LOTRIMIN) 1 % vaginal cream  norethindrone-ethinyl estradiol (MICROGESTIN 1.5/30) 1.5-30 MG-MCG tablet  ondansetron (ZOFRAN ODT) 4 MG ODT tab  paragard intrauterine copper device  Prenatal Vit-Fe Fumarate-FA (PRENATAL VITAMINS) 28-0.8 MG TABS      Allergies   Allergen Reactions     Metronidazole Angioedema and Swelling     Also dizzy and hard to swallow  Other reaction(s): Swelling of Face,throat,lips or tongue/Angioedema - Allergy  Also dizzy and hard to swallow  Also dizzy and hard to swallow  Also dizzy and hard to swallow       Blood-Group Specific Substance Other (See Comments)     Patient has a probable passive anti-D antibody. Blood products may be delayed. Draw patient 24 hours prior to transfusion. Draw one red top and two purple top tubes for all type and screen  orders.  Patient has a probable passive anti-D antibody. Blood products may be delayed. Draw patient 24 hours prior to transfusion. Draw one red top and two purple top tubes for all type and screen orders.       Family History  No family history on file.  Social History   Social History     Tobacco Use     Smoking status: Never     Smokeless tobacco: Never   Substance Use Topics     Alcohol use: Not Currently     Drug use: Not Currently      Past medical history, past surgical history, medications, allergies, family history, and social history were reviewed with the patient. No additional pertinent items.       Review of Systems  A complete review of systems was performed with pertinent positives and negatives noted in the HPI, and all other systems negative.    ROS: 14 point ROS neg other than the symptoms noted above in the HPI.     Physical Exam   BP: 105/74  Pulse: 83  Temp: 98.1  F (36.7  C)  Resp: 16  SpO2: 99 %  Physical Exam  Vitals and nursing note reviewed.   Constitutional:       General: She is not in acute distress.     Appearance: She is not diaphoretic.   HENT:      Head: Atraumatic.      Mouth/Throat:      Pharynx: No oropharyngeal exudate.   Eyes:      General: No scleral icterus.     Pupils: Pupils are equal, round, and reactive to light.   Cardiovascular:      Heart sounds: Normal heart sounds.   Pulmonary:      Effort: No respiratory distress.      Breath sounds: Normal breath sounds.   Abdominal:      General: Bowel sounds are normal.      Palpations: Abdomen is soft.      Tenderness: There is no abdominal tenderness.   Musculoskeletal:         General: No tenderness.   Skin:     General: Skin is warm.      Findings: No rash.         ED Course     4:34 PM  The patient was seen and examined by Shahriar Cochran MD in Room ED05.    Procedures            EKG Interpretation:      Interpreted by Shahriar Cochran MD  Time reviewed: 1700  Symptoms at time of EKG: Dizzy  Rhythm: sinus bradycardia  Rate:  50-60  Axis: Normal  Ectopy: none  Conduction: normal  ST Segments/ T Waves: No ST-T wave changes and No acute ischemic changes  Q Waves: none  Comparison to prior: No old EKG available    Clinical Impression: normal EKG                      The medical record was reviewed and interpreted.  Current labs reviewed and interpreted.  Previous labs reviewed and interpreted.  Current images reviewed and interpreted: Pelvic ultrasound.  EKG reviewed and interpreted: Sinus bradycardia, rate 55 otherwise normal EKG.  Managed outpatient prescription medications.           Results for orders placed or performed during the hospital encounter of 11/11/22   Basic metabolic panel     Status: Normal   Result Value Ref Range    Sodium 139 133 - 144 mmol/L    Potassium 3.7 3.4 - 5.3 mmol/L    Chloride 106 94 - 109 mmol/L    Carbon Dioxide (CO2) 23 20 - 32 mmol/L    Anion Gap 10 3 - 14 mmol/L    Urea Nitrogen 9 7 - 30 mg/dL    Creatinine 0.54 0.52 - 1.04 mg/dL    Calcium 9.1 8.5 - 10.1 mg/dL    Glucose 88 70 - 99 mg/dL    GFR Estimate >90 >60 mL/min/1.73m2   CBC with platelets and differential     Status: None   Result Value Ref Range    WBC Count 5.8 4.0 - 11.0 10e3/uL    RBC Count 4.09 3.80 - 5.20 10e6/uL    Hemoglobin 12.7 11.7 - 15.7 g/dL    Hematocrit 35.7 35.0 - 47.0 %    MCV 87 78 - 100 fL    MCH 31.1 26.5 - 33.0 pg    MCHC 35.6 31.5 - 36.5 g/dL    RDW 11.9 10.0 - 15.0 %    Platelet Count 184 150 - 450 10e3/uL    % Neutrophils 47 %    % Lymphocytes 41 %    % Monocytes 11 %    % Eosinophils 1 %    % Basophils 0 %    % Immature Granulocytes 0 %    NRBCs per 100 WBC 0 <1 /100    Absolute Neutrophils 2.7 1.6 - 8.3 10e3/uL    Absolute Lymphocytes 2.4 0.8 - 5.3 10e3/uL    Absolute Monocytes 0.7 0.0 - 1.3 10e3/uL    Absolute Eosinophils 0.1 0.0 - 0.7 10e3/uL    Absolute Basophils 0.0 0.0 - 0.2 10e3/uL    Absolute Immature Granulocytes 0.0 <=0.4 10e3/uL    Absolute NRBCs 0.0 10e3/uL   EKG 12-lead, tracing only     Status: None  (Preliminary result)   Result Value Ref Range    Systolic Blood Pressure  mmHg    Diastolic Blood Pressure  mmHg    Ventricular Rate 57 BPM    Atrial Rate 57 BPM    NC Interval 150 ms    QRS Duration 86 ms     ms    QTc 385 ms    P Axis 62 degrees    R AXIS 38 degrees    T Axis 39 degrees    Interpretation ECG Sinus bradycardia  Otherwise normal ECG      CBC with platelets differential     Status: None    Narrative    The following orders were created for panel order CBC with platelets differential.  Procedure                               Abnormality         Status                     ---------                               -----------         ------                     CBC with platelets and d...[[id number]]                      Final result                 Please view results for these tests on the individual orders.     Medications   lactated ringers BOLUS 1,000 mL (1,000 mLs Intravenous New Bag 22 9399)     Followed by   lactated ringers infusion (has no administration in time range)        Assessments & Plan (with Medical Decision Making)   A 32-year-old female,  4 para 3-0-0-3, who suffered a fall yesterday while feeling lightheaded but did not have ulises syncope.  She presents now with slight cramping and bleeding.  Differential diagnosis of her lightheadedness and fall includes dehydration, orthostatic hypotension, vasovagal near syncope, QT prolongation due to use of antiemetics or congenital conduction disease, less likely arrhythmia, structural heart disease, atypical seizure.  With respect to her pelvic pain and bleeding differential includes ectopic pregnancy, spontaneous or threatened AB, vaginitis, ovarian cyst rupture.  On exam her vitals are normal, mental status normal, neck supple, mucous membranes slightly dry.  Cardiovascular exam normal.  Neurologic exam normal.  She has mild suprapubic tenderness.  Remainder of complete physical exam normal.  EKG sinus bradycardia otherwise  normal, no abnormal conduction or ischemic changes.  Labs normal hemoglobin, normal electrolytes.  Patient known to be Rh type positive.  Ultrasound shows live intrauterine pregnancy at 10 weeks.  Patient feeling much better, continues to have a benign exam, no active bleeding.  Clinically patient appears appropriate to be discharged home, will recommend pelvic rest, and monitoring of symptoms.  Will follow-up outpatient with her OB/GYN if there is ongoing bleeding or any other new concerns.    I have reviewed the nursing notes. I have reviewed the findings, diagnosis, plan and need for follow up with the patient.    New Prescriptions    No medications on file       Final diagnoses:   Fall, initial encounter   Bleeding in early pregnancy   Nausea and vomiting in pregnancy     I, Janice Cochran, am serving as a trained medical scribe to document services personally performed by Shahriar Cochran MD, based on the provider's statements to me.   IShahriar MD, was physically present and have reviewed and verified the accuracy of this note documented by Janice Cochran.   --  Shahriar Cochran MD  [facility] EMERGENCY DEPARTMENT  11/11/2022     Shahriar Cochran MD  11/11/22 6421

## 2022-11-11 NOTE — ED TRIAGE NOTES
Pt is 10 wks preg fell on her right hip yesterday.  Pt states since that time she has had vaginal bleeding x3 episodes today and is having lower abd cramping. Pt also complaining of N/V and generalized weakness.     Triage Assessment     Row Name 11/11/22 6851       Triage Assessment (Adult)    Airway WDL WDL       Respiratory WDL    Respiratory WDL WDL       Skin Circulation/Temperature WDL    Skin Circulation/Temperature WDL WDL       Cardiac WDL    Cardiac WDL WDL       Peripheral/Neurovascular WDL    Peripheral Neurovascular WDL WDL       Cognitive/Neuro/Behavioral WDL    Cognitive/Neuro/Behavioral WDL WDL

## 2022-11-12 LAB
ATRIAL RATE - MUSE: 57 BPM
DIASTOLIC BLOOD PRESSURE - MUSE: NORMAL MMHG
INTERPRETATION ECG - MUSE: NORMAL
P AXIS - MUSE: 62 DEGREES
PR INTERVAL - MUSE: 150 MS
QRS DURATION - MUSE: 86 MS
QT - MUSE: 396 MS
QTC - MUSE: 385 MS
R AXIS - MUSE: 38 DEGREES
SYSTOLIC BLOOD PRESSURE - MUSE: NORMAL MMHG
T AXIS - MUSE: 39 DEGREES
VENTRICULAR RATE- MUSE: 57 BPM

## 2022-11-12 NOTE — DISCHARGE INSTRUCTIONS
Thank you for choosing [facility].     Please closely monitor for further symptoms. Return to the Emergency Department if you develop any new or worsening signs or symptoms.    If you received any opiate pain medications or sedatives during your visit, please do not drive for at least 8 hours.     Labs, cultures or final xray interpretations may still need to be reviewed.  We will call you if your plan of care needs to be changed.    Please follow up with your primary care physician or clinic.

## 2023-01-22 ENCOUNTER — HOSPITAL ENCOUNTER (EMERGENCY)
Facility: CLINIC | Age: 33
Discharge: HOME | End: 2023-01-22
Attending: EMERGENCY MEDICINE | Admitting: EMERGENCY MEDICINE
Payer: COMMERCIAL

## 2023-01-22 ENCOUNTER — APPOINTMENT (OUTPATIENT)
Dept: GENERAL RADIOLOGY | Facility: CLINIC | Age: 33
End: 2023-01-22
Attending: EMERGENCY MEDICINE
Payer: COMMERCIAL

## 2023-01-22 VITALS
SYSTOLIC BLOOD PRESSURE: 88 MMHG | RESPIRATION RATE: 18 BRPM | HEART RATE: 83 BPM | OXYGEN SATURATION: 97 % | DIASTOLIC BLOOD PRESSURE: 59 MMHG | TEMPERATURE: 98.3 F

## 2023-01-22 DIAGNOSIS — O26.92: ICD-10-CM

## 2023-01-22 DIAGNOSIS — E86.0 DEHYDRATION: ICD-10-CM

## 2023-01-22 LAB
ALBUMIN SERPL-MCNC: 2.6 G/DL (ref 3.4–5)
ALBUMIN UR-MCNC: NEGATIVE MG/DL
ALP SERPL-CCNC: 46 U/L (ref 40–150)
ALT SERPL W P-5'-P-CCNC: 16 U/L (ref 0–50)
ANION GAP SERPL CALCULATED.3IONS-SCNC: 8 MMOL/L (ref 3–14)
APPEARANCE UR: CLEAR
AST SERPL W P-5'-P-CCNC: 11 U/L (ref 0–45)
ATRIAL RATE - MUSE: 88 BPM
BACTERIA #/AREA URNS HPF: ABNORMAL /HPF
BASOPHILS # BLD AUTO: 0 10E3/UL (ref 0–0.2)
BASOPHILS NFR BLD AUTO: 0 %
BILIRUB SERPL-MCNC: 0.4 MG/DL (ref 0.2–1.3)
BILIRUB UR QL STRIP: NEGATIVE
BUN SERPL-MCNC: 5 MG/DL (ref 7–30)
CALCIUM SERPL-MCNC: 8.3 MG/DL (ref 8.5–10.1)
CHLORIDE BLD-SCNC: 110 MMOL/L (ref 94–109)
CO2 SERPL-SCNC: 20 MMOL/L (ref 20–32)
COLOR UR AUTO: ABNORMAL
CREAT SERPL-MCNC: 0.56 MG/DL (ref 0.52–1.04)
DIASTOLIC BLOOD PRESSURE - MUSE: NORMAL MMHG
EOSINOPHIL # BLD AUTO: 0 10E3/UL (ref 0–0.7)
EOSINOPHIL NFR BLD AUTO: 0 %
ERYTHROCYTE [DISTWIDTH] IN BLOOD BY AUTOMATED COUNT: 13.4 % (ref 10–15)
FLUAV RNA SPEC QL NAA+PROBE: NEGATIVE
FLUBV RNA RESP QL NAA+PROBE: NEGATIVE
GFR SERPL CREATININE-BSD FRML MDRD: >90 ML/MIN/1.73M2
GLUCOSE BLD-MCNC: 109 MG/DL (ref 70–99)
GLUCOSE UR STRIP-MCNC: NEGATIVE MG/DL
HCO3 BLDV-SCNC: 19 MMOL/L (ref 21–28)
HCT VFR BLD AUTO: 30.2 % (ref 35–47)
HGB BLD-MCNC: 10.3 G/DL (ref 11.7–15.7)
HGB UR QL STRIP: NEGATIVE
IMM GRANULOCYTES # BLD: 0 10E3/UL
IMM GRANULOCYTES NFR BLD: 0 %
INTERPRETATION ECG - MUSE: NORMAL
KETONES UR STRIP-MCNC: NEGATIVE MG/DL
LACTATE BLD-SCNC: 2 MMOL/L
LEUKOCYTE ESTERASE UR QL STRIP: ABNORMAL
LYMPHOCYTES # BLD AUTO: 0.4 10E3/UL (ref 0.8–5.3)
LYMPHOCYTES NFR BLD AUTO: 6 %
MCH RBC QN AUTO: 31.6 PG (ref 26.5–33)
MCHC RBC AUTO-ENTMCNC: 34.1 G/DL (ref 31.5–36.5)
MCV RBC AUTO: 93 FL (ref 78–100)
MONOCYTES # BLD AUTO: 0.1 10E3/UL (ref 0–1.3)
MONOCYTES NFR BLD AUTO: 2 %
MUCOUS THREADS #/AREA URNS LPF: PRESENT /LPF
NEUTROPHILS # BLD AUTO: 5.4 10E3/UL (ref 1.6–8.3)
NEUTROPHILS NFR BLD AUTO: 92 %
NITRATE UR QL: NEGATIVE
NRBC # BLD AUTO: 0 10E3/UL
NRBC BLD AUTO-RTO: 0 /100
NT-PROBNP SERPL-MCNC: 95 PG/ML (ref 0–450)
P AXIS - MUSE: 53 DEGREES
PCO2 BLDV: 26 MM HG (ref 40–50)
PH BLDV: 7.46 [PH] (ref 7.32–7.43)
PH UR STRIP: 7 [PH] (ref 5–7)
PLATELET # BLD AUTO: 138 10E3/UL (ref 150–450)
PO2 BLDV: 98 MM HG (ref 25–47)
POTASSIUM BLD-SCNC: 3.3 MMOL/L (ref 3.4–5.3)
PR INTERVAL - MUSE: 162 MS
PROT SERPL-MCNC: 5.8 G/DL (ref 6.8–8.8)
QRS DURATION - MUSE: 84 MS
QT - MUSE: 346 MS
QTC - MUSE: 418 MS
R AXIS - MUSE: 40 DEGREES
RBC # BLD AUTO: 3.26 10E6/UL (ref 3.8–5.2)
RBC URINE: 2 /HPF
RSV RNA SPEC NAA+PROBE: NEGATIVE
SAO2 % BLDV: 98 % (ref 94–100)
SARS-COV-2 RNA RESP QL NAA+PROBE: NEGATIVE
SODIUM SERPL-SCNC: 138 MMOL/L (ref 133–144)
SP GR UR STRIP: 1.01 (ref 1–1.03)
SQUAMOUS EPITHELIAL: 9 /HPF
SYSTOLIC BLOOD PRESSURE - MUSE: NORMAL MMHG
T AXIS - MUSE: 36 DEGREES
TROPONIN I SERPL HS-MCNC: 30 NG/L
UROBILINOGEN UR STRIP-MCNC: NORMAL MG/DL
VENTRICULAR RATE- MUSE: 88 BPM
WBC # BLD AUTO: 6 10E3/UL (ref 4–11)
WBC URINE: 9 /HPF

## 2023-01-22 PROCEDURE — 250N000011 HC RX IP 250 OP 636: Performed by: EMERGENCY MEDICINE

## 2023-01-22 PROCEDURE — 82803 BLOOD GASES ANY COMBINATION: CPT

## 2023-01-22 PROCEDURE — 93005 ELECTROCARDIOGRAM TRACING: CPT | Performed by: EMERGENCY MEDICINE

## 2023-01-22 PROCEDURE — 87086 URINE CULTURE/COLONY COUNT: CPT | Performed by: EMERGENCY MEDICINE

## 2023-01-22 PROCEDURE — C9803 HOPD COVID-19 SPEC COLLECT: HCPCS | Performed by: EMERGENCY MEDICINE

## 2023-01-22 PROCEDURE — 87040 BLOOD CULTURE FOR BACTERIA: CPT | Performed by: EMERGENCY MEDICINE

## 2023-01-22 PROCEDURE — 99285 EMERGENCY DEPT VISIT HI MDM: CPT | Mod: CS,25 | Performed by: EMERGENCY MEDICINE

## 2023-01-22 PROCEDURE — 85025 COMPLETE CBC W/AUTO DIFF WBC: CPT | Performed by: EMERGENCY MEDICINE

## 2023-01-22 PROCEDURE — 87637 SARSCOV2&INF A&B&RSV AMP PRB: CPT | Performed by: EMERGENCY MEDICINE

## 2023-01-22 PROCEDURE — 84484 ASSAY OF TROPONIN QUANT: CPT | Performed by: EMERGENCY MEDICINE

## 2023-01-22 PROCEDURE — 99284 EMERGENCY DEPT VISIT MOD MDM: CPT | Mod: CS | Performed by: EMERGENCY MEDICINE

## 2023-01-22 PROCEDURE — 258N000003 HC RX IP 258 OP 636: Performed by: EMERGENCY MEDICINE

## 2023-01-22 PROCEDURE — 96361 HYDRATE IV INFUSION ADD-ON: CPT | Performed by: EMERGENCY MEDICINE

## 2023-01-22 PROCEDURE — 80053 COMPREHEN METABOLIC PANEL: CPT | Performed by: EMERGENCY MEDICINE

## 2023-01-22 PROCEDURE — 250N000013 HC RX MED GY IP 250 OP 250 PS 637: Performed by: EMERGENCY MEDICINE

## 2023-01-22 PROCEDURE — 83880 ASSAY OF NATRIURETIC PEPTIDE: CPT | Performed by: EMERGENCY MEDICINE

## 2023-01-22 PROCEDURE — 81001 URINALYSIS AUTO W/SCOPE: CPT | Performed by: EMERGENCY MEDICINE

## 2023-01-22 PROCEDURE — 96374 THER/PROPH/DIAG INJ IV PUSH: CPT | Performed by: EMERGENCY MEDICINE

## 2023-01-22 PROCEDURE — 93010 ELECTROCARDIOGRAM REPORT: CPT | Performed by: EMERGENCY MEDICINE

## 2023-01-22 PROCEDURE — 36415 COLL VENOUS BLD VENIPUNCTURE: CPT | Performed by: EMERGENCY MEDICINE

## 2023-01-22 PROCEDURE — 99204 OFFICE O/P NEW MOD 45 MIN: CPT | Mod: GC | Performed by: OBSTETRICS & GYNECOLOGY

## 2023-01-22 PROCEDURE — 71046 X-RAY EXAM CHEST 2 VIEWS: CPT

## 2023-01-22 RX ORDER — ACETAMINOPHEN 325 MG/1
975 TABLET ORAL ONCE
Status: COMPLETED | OUTPATIENT
Start: 2023-01-22 | End: 2023-01-22

## 2023-01-22 RX ORDER — POTASSIUM CHLORIDE 1.5 G/1.58G
40 POWDER, FOR SOLUTION ORAL ONCE
Status: COMPLETED | OUTPATIENT
Start: 2023-01-22 | End: 2023-01-22

## 2023-01-22 RX ORDER — SODIUM CHLORIDE 9 MG/ML
INJECTION, SOLUTION INTRAVENOUS CONTINUOUS
Status: DISCONTINUED | OUTPATIENT
Start: 2023-01-22 | End: 2023-01-23 | Stop reason: HOSPADM

## 2023-01-22 RX ORDER — ONDANSETRON 2 MG/ML
4 INJECTION INTRAMUSCULAR; INTRAVENOUS ONCE
Status: COMPLETED | OUTPATIENT
Start: 2023-01-22 | End: 2023-01-22

## 2023-01-22 RX ADMIN — SODIUM CHLORIDE 1000 ML: 9 INJECTION, SOLUTION INTRAVENOUS at 19:13

## 2023-01-22 RX ADMIN — ACETAMINOPHEN 975 MG: 325 TABLET, FILM COATED ORAL at 19:07

## 2023-01-22 RX ADMIN — POTASSIUM CHLORIDE 40 MEQ: 1.5 FOR SOLUTION ORAL at 20:49

## 2023-01-22 RX ADMIN — ONDANSETRON 4 MG: 2 INJECTION INTRAMUSCULAR; INTRAVENOUS at 19:07

## 2023-01-22 ASSESSMENT — ACTIVITIES OF DAILY LIVING (ADL)
ADLS_ACUITY_SCORE: 35
ADLS_ACUITY_SCORE: 35

## 2023-01-22 NOTE — ED TRIAGE NOTES
Pt is around 20 weeks gestation. States she has a bad headache, bodyaches all over, shortness of breath and vomited this morning. Pt states she last ate around 3pm, has been taking sips of water. Pt denies any current contractions or vaginal discharge. Pt denies taking any medications at home. Pt has a PIV placed on R lower arm, pt states she goes to [facility] for her OB provider.     Triage Assessment       Row Name 01/22/23 2820       Triage Assessment (Adult)    Airway WDL WDL       Respiratory WDL    Respiratory WDL WDL       Skin Circulation/Temperature WDL    Skin Circulation/Temperature WDL WDL       Cardiac WDL    Cardiac WDL WDL       Peripheral/Neurovascular WDL    Peripheral Neurovascular WDL WDL       Cognitive/Neuro/Behavioral WDL    Cognitive/Neuro/Behavioral WDL WDL

## 2023-01-23 NOTE — DISCHARGE INSTRUCTIONS
Please make an appointment to follow up with your OBGYN as soon as possible.      If you have any worsening symptoms including fever, increased headache, vomiting, lightheadedness or other concerns, return to the emergency department for re-evaluation.

## 2023-01-23 NOTE — ED PROVIDER NOTES
[facility] EMERGENCY DEPARTMENT ([facility])     2023     History     Chief Complaint   Patient presents with     Headache     Started this morning     Shortness of Breath     HPI  Wilfred Barros is a 33 year old 20 weeks pregnant  female with a past medical history including s/p , history of premature delivery, pituitary macroadenoma who presents to the Emergency Department for evaluation of headache, generalized myalgias, and vomiting. Patient reports 2 days of frontal headache which she describes as feeling like pressure in her forehead. She states that the headache is worse today and that she is not sleeping well due to the pain. In addition she reports onset of associated generalized myalgias and joint pain today with associated x2 shivering episodes and x2 vomiting episodes this morning. No chest pain or shortness of breath, though she does note she feels that she is breathing rapidly. She has not noticed any leg swelling. Endorses nausea and lower abdominal discomfort this morning that has since resolved.  She felt some symptomatic improvement of abdominal pain this morning after she passed a BM. Patient reports that she uses 1 bag of IV fluids at home per day but did not use any today as her IV infiltrated.    The patient reports that she had multiple episodes of vomiting earlier in this current pregnancy which had stopped until now. She feels that her baby is moving normally and has not noticed any blood or leakage of fluid from her vagina. She is in her 4th pregnancy currently, has had 3 prior live births (2 full-term, 1 pre-term). Otherwise she denies any complications with current pregnancy.    Patient denies fever, vision changes, cough, burning pain with urination, urinary urgency, or any other urinary symptoms. No numbness or weakness to arms or legs. She does endorse sneezing and does note that her children have had runny nose and sneezing for the past 2 days, though  she states they were not symptomatic today.    Filipino interpretation provided by patient's friend.    Past Medical History  History reviewed. No pertinent past medical history.  History reviewed. No pertinent surgical history.  cholecalciferol (VITAMIN D3) 10 mcg (400 units) TABS tablet  norethindrone-ethinyl estradiol (MICROGESTIN 1.5/30) 1.5-30 MG-MCG tablet  ondansetron (ZOFRAN ODT) 4 MG ODT tab  paragard intrauterine copper device  Prenatal Vit-Fe Fumarate-FA (PRENATAL VITAMINS) 28-0.8 MG TABS      Allergies   Allergen Reactions     Metronidazole Angioedema and Swelling     Also dizzy and hard to swallow  Other reaction(s): Swelling of Face,throat,lips or tongue/Angioedema - Allergy  Also dizzy and hard to swallow  Also dizzy and hard to swallow  Also dizzy and hard to swallow       Blood-Group Specific Substance Other (See Comments)     Patient has a probable passive anti-D antibody. Blood products may be delayed. Draw patient 24 hours prior to transfusion. Draw one red top and two purple top tubes for all type and screen orders.  Patient has a probable passive anti-D antibody. Blood products may be delayed. Draw patient 24 hours prior to transfusion. Draw one red top and two purple top tubes for all type and screen orders.       Family History  No family history on file.  Social History   Social History     Tobacco Use     Smoking status: Never     Smokeless tobacco: Never   Substance Use Topics     Alcohol use: Not Currently     Drug use: Not Currently      Past medical history, past surgical history, medications, allergies, family history, and social history were reviewed with the patient. No additional pertinent items.      A medically appropriate review of systems was performed with pertinent positives and negatives noted in the HPI, and all other systems negative.    Physical Exam   BP: (!) 85/52  Pulse: (!) 125  Temp: 98.5  F (36.9  C)  Resp: 16  SpO2: 100 %  Physical Exam  General: patient is alert and  oriented and in no acute distress   Head: atraumatic and normocephalic   EENT: moist mucus membranes without tonsillar erythema or exudates, pupils 3 mm, equal round and reactive, extraocular movements intact, no nystagmus, sclera anicteric, no frontal or maxillary sinus TTP  Neck: supple without meningismus  Cardiovascular: Sinus tachycardia, extremities warm and well perfused, no lower extremity edema  Pulmonary: lungs clear to auscultation bilaterally   Abdomen: soft, gravid abdomen, nontender, no CVA tenderness  Musculoskeletal: normal range of motion   Neurological: alert and oriented, moving all extremities symmetrically, CN II-XII intact, strength 5/5 and symmetric in , elbow flexion/extension, hip flexion/extension, knee flexion/extension and ankle plantar/dorsiflexion, sensation to light touch in distal upper and lower extremities intact, normal gait  Skin: warm, dry      ED Course, Procedures, & Data     6:11 PM  The patient was seen and examined by Mariah Wolff MD in Room ED18.    Procedures       ED Course Selections:        EKG Interpretation:      Interpreted by Mariah Wolff MD  Time reviewed: 1833  Symptoms at time of EKG: none   Rhythm: normal sinus   Rate: normal  Axis: normal  Ectopy: none  Conduction: normal  ST Segments/ T Waves: No ST-T wave changes  Q Waves: none  Comparison to prior: No old EKG available    Clinical Impression: normal EKG                     No results found for any visits on 01/22/23.  Medications - No data to display  Labs Ordered and Resulted from Time of ED Arrival to Time of ED Departure - No data to display  No orders to display          Medical Decision Making  The patient presented with a problem that is acute and uncomplicated.    The patient's evaluation involved:  review of 3+ prior external note(s) (see separate area of note for details)  ordering and review of 3+ test(s) (see separate area of note for details)  review of 3+ test result(s) ordered prior  to this encounter (see separate area of note for details)  discussion of management or test interpretation with another health professional (see separate area of note for details)    The patient's management involved a decision regarding hospitalization.      Assessment & Plan    Ms. Barros is a 33 year old 20 weeks pregnant  female with a past medical history including s/p , history of premature delivery, pituitary macroadenoma who presents to the Emergency Department for evaluation of headache, generalized myalgias, and vomiting.  Patient initially presented tachycardic and hypotensive.  She is in no respiratory distress, saturating 97% on room air.  Broad differential diagnosis is considered and includes but is not limited to dehydration, COVID, influenza, pneumonia, UTI, anemia, electrolyte derangement, less likely meningitis, intracranial hemorrhage, CVA or mass.  She was given IV fluids, acetaminophen and Zofran.  Heart rate and blood pressure did improve.  Laboratory evaluation demonstrates a lactate of 2, no leukocytosis, hemoglobin of 10.3.  Potassium is slightly low at 3.3 and was supplemented.  No other significant electrolyte abnormalities.  Her ECG shows normal sinus rhythm without acute ischemic changes.  Troponin is 30.  She does not have lower extremity edema or signs of heart failure, BNP is 95.  Low suspicion for PE.  Influenza and COVID are negative.  Chest x-ray shows no infiltrate.  UA appears contaminated and was sent for culture.  Blood cultures were drawn and pending.  On reevaluation she does report feeling improved.  I have offered an observation stay to ensure she does not have any worsening hemodynamics and is tolerating p.o. however she has declined.  She was seen and evaluated by OB and have recommended discharge to home.  Recommended holding on any antibiotics pending urine cultures.  I do note that she has a history of pituitary adenoma and has not had any imaging  since 2017.  She was offered additional imaging at this time but has declined.  She reports her headache is now completely resolved.  I have instructed her to continue her outpatient home infusions to avoid dehydration and follow-up closely with her OB.  She was given very close return precautions if she should have any worsening symptoms including fevers, lightheadedness, severe headache, vomiting or other concerns to immediately return to the emergency department for reevaluation.    I have reviewed the nursing notes. I have reviewed the findings, diagnosis, plan and need for follow up with the patient.    New Prescriptions    No medications on file       Final diagnoses:   Dehydration   Pregnancy-related symptom in second trimester     I, Janice Cochran, am serving as a trained medical scribe to document services personally performed by Mariah Wolff MD, based on the provider's statements to me.   I, Mariah Wolff MD, was physically present and have reviewed and verified the accuracy of this note documented by Janice Cochran.    Mariah Wolff MD  [facility] EMERGENCY DEPARTMENT  1/22/2023     Mariah Wolff MD  01/22/23 0851

## 2023-01-23 NOTE — RESULT ENCOUNTER NOTE
[facility] Emergency Dept discharge antibiotic (if prescribed): None  No changes in treatment per [facility] ED Lab Result Urine culture protocol.

## 2023-01-23 NOTE — CONSULTS
OBGYN Consult Note    Patient Summary:  Wilfred Barros is a 33 year old seen at the request of Dr. Brown.    Chief Complaint: Headache, general fatigue    HPI: Wilfred Barros is a  at 20w2d by LMP who presents to the ED today with 2 days of headache and general fatigue. Her pregnancy is notable for hx of CS, hx  birth, chronic hepatitis B, and recent hyperemesis gravidarum requiring home IVF infusions. Patient reports she doesn't usually get headaches but yesterday one came on and hasn't gone away, though at this point in time is now feeling much better. She didn't take any meds at home for this. Reports she was unable to get her home IV fluids today due to issues with her IV access. She normally gets fluids daily. Reports her oral intake today has been ok, vomited once this morning but has eaten some small meals and trying to stay hydrated. Denies runny nose, sore throat, cough, chest pain, SOB. Notes she does have one child at home who is sick with runny nose. She denies diarrhea, constipation, urinary symptoms. No contractions, bleeding, abdominal pain, or LOF.    OBGYN hx:  Obstetric hx:  x1 > CS x1 >  x1  Hx  birth (25w)  Hyperemesis gravidarum  Last pap: 2020 NILM    PMH:     Anemia     Anxiety     Depression     Endometrial polyp     Female circumcision     History of blood transfusion     Infectious viral hepatitis     Latent tuberculosis noted      PSHx:   . Laterality Date     (IA) MA EXCIS PITUITARY TRANSNASAL/SEPTAL 2013 transphenoidal pituitary tumor resection; Stealth-guided sublabial TSH with iMRI and lysis of bilateral nasal adhesions, abdominal fat graft      SECTION     hx partial pituitary resection     TONSILLECTOMY     Medications:  Current Facility-Administered Medications   Medication     sodium chloride 0.9% infusion     Current Outpatient Medications   Medication     cholecalciferol (VITAMIN D3) 10 mcg (400 units) TABS tablet      norethindrone-ethinyl estradiol (MICROGESTIN 1.5/30) 1.5-30 MG-MCG tablet     ondansetron (ZOFRAN ODT) 4 MG ODT tab     paragard intrauterine copper device     Prenatal Vit-Fe Fumarate-FA (PRENATAL VITAMINS) 28-0.8 MG TABS     No current facility-administered medications on file prior to encounter.  cholecalciferol (VITAMIN D3) 10 mcg (400 units) TABS tablet, Vitamin D3 10 mcg (400 unit) tablet  norethindrone-ethinyl estradiol (MICROGESTIN 1.5/30) 1.5-30 MG-MCG tablet, Take 1 tablet by mouth for 42days, then take 1 week break and start a new pack.  ondansetron (ZOFRAN ODT) 4 MG ODT tab, Take 1 tablet (4 mg) by mouth every 8 hours as needed for nausea  paragard intrauterine copper device, 1 Device by Intrauterine route  Prenatal Vit-Fe Fumarate-FA (PRENATAL VITAMINS) 28-0.8 MG TABS, Take 1 tablet by mouth daily      Allergies:   Allergies   Allergen Reactions     Metronidazole Angioedema and Swelling     Also dizzy and hard to swallow  Other reaction(s): Swelling of Face,throat,lips or tongue/Angioedema - Allergy  Also dizzy and hard to swallow  Also dizzy and hard to swallow  Also dizzy and hard to swallow       Blood-Group Specific Substance Other (See Comments)     Patient has a probable passive anti-D antibody. Blood products may be delayed. Draw patient 24 hours prior to transfusion. Draw one red top and two purple top tubes for all type and screen orders.  Patient has a probable passive anti-D antibody. Blood products may be delayed. Draw patient 24 hours prior to transfusion. Draw one red top and two purple top tubes for all type and screen orders.       Social History:   Social History     Socioeconomic History     Marital status: Single     Spouse name: Not on file     Number of children: Not on file     Years of education: Not on file     Highest education level: Not on file   Occupational History     Not on file   Tobacco Use     Smoking status: Never     Smokeless tobacco: Never   Substance and Sexual  Activity     Alcohol use: Not Currently     Drug use: Not Currently     Sexual activity: Yes     Partners: Male     Birth control/protection: None   Other Topics Concern     Not on file   Social History Narrative     Not on file     Social Determinants of Health     Financial Resource Strain: Not on file   Food Insecurity: Not on file   Transportation Needs: Not on file   Physical Activity: Not on file   Stress: Not on file   Social Connections: Not on file   Intimate Partner Violence: Not on file   Housing Stability: Not on file       ROS: 10-point review of systems negative unless otherwise noted in HPI    Physical Exam:   Vitals:    01/22/23 1752 01/22/23 1948 01/22/23 2000   BP: (!) 85/52 92/51 93/52   Pulse: (!) 125 77    Resp: 16 18    Temp: 98.5  F (36.9  C) 98.3  F (36.8  C)    TempSrc: Oral Oral    SpO2: 100% 100% 100%      Gen: NAD  CV: RRR  Resp: CTAB, good effort without distress   Abdomen: soft, non tender, non distended, no masses, no hernias.   Lower extremities: non-tender, no edema  Skin: no lesions or rashes    Labs:   Results for orders placed or performed during the hospital encounter of 01/22/23 (from the past 24 hour(s))   EKG 12-lead, tracing only   Result Value Ref Range    Systolic Blood Pressure  mmHg    Diastolic Blood Pressure  mmHg    Ventricular Rate 88 BPM    Atrial Rate 88 BPM    MS Interval 162 ms    QRS Duration 84 ms     ms    QTc 418 ms    P Axis 53 degrees    R AXIS 40 degrees    T Axis 36 degrees    Interpretation ECG       Sinus rhythm  Normal ECG  Unconfirmed report - interpretation of this ECG is computer generated - see medical record for final interpretation  Confirmed by - EMERGENCY ROOM, PHYSICIAN ([id number]),  MIKE PICKERING ([id number]) on 1/22/2023 9:45:42 PM     Symptomatic Influenza A/B & SARS-CoV2 (COVID-19) Virus PCR Multiplex Nasopharyngeal    Specimen: Nasopharyngeal; Swab   Result Value Ref Range    Influenza A PCR Negative Negative    Influenza B PCR  Negative Negative    RSV PCR Negative Negative    SARS CoV2 PCR Negative Negative    Narrative    Testing was performed using the Xpert Xpress CoV2/Flu/RSV Assay on the [employer] GeneXpert Instrument. This test should be ordered for the detection of SARS-CoV-2 and influenza viruses in individuals who meet clinical and/or epidemiological criteria. Test performance is unknown in asymptomatic patients. This test is for in vitro diagnostic use under the FDA EUA for laboratories certified under CLIA to perform high or moderate complexity testing. This test has not been FDA cleared or approved. A negative result does not rule out the presence of PCR inhibitors in the specimen or target RNA in concentration below the limit of detection for the assay. If only one viral target is positive but coinfection with multiple targets is suspected, the sample should be re-tested with another FDA cleared, approved, or authorized test, if coinfection would change clinical management. This test was validated by the [facility] [employer]. These laboratories are certified under the Clinical Laboratory Improvement Amendments of 1988 (CLIA-88) as qualified to perform high complexity laboratory testing.   CBC with platelets differential    Narrative    The following orders were created for panel order CBC with platelets differential.  Procedure                               Abnormality         Status                     ---------                               -----------         ------                     CBC with platelets and d...[[id number]]  Abnormal            Final result                 Please view results for these tests on the individual orders.   Comprehensive metabolic panel   Result Value Ref Range    Sodium 138 133 - 144 mmol/L    Potassium 3.3 (L) 3.4 - 5.3 mmol/L    Chloride 110 (H) 94 - 109 mmol/L    Carbon Dioxide (CO2) 20 20 - 32 mmol/L    Anion Gap 8 3 - 14 mmol/L    Urea Nitrogen 5 (L) 7 - 30 mg/dL    Creatinine  0.56 0.52 - 1.04 mg/dL    Calcium 8.3 (L) 8.5 - 10.1 mg/dL    Glucose 109 (H) 70 - 99 mg/dL    Alkaline Phosphatase 46 40 - 150 U/L    AST 11 0 - 45 U/L    ALT 16 0 - 50 U/L    Protein Total 5.8 (L) 6.8 - 8.8 g/dL    Albumin 2.6 (L) 3.4 - 5.0 g/dL    Bilirubin Total 0.4 0.2 - 1.3 mg/dL    GFR Estimate >90 >60 mL/min/1.73m2   Troponin I   Result Value Ref Range    Troponin I High Sensitivity 30 <54 ng/L   Nt probnp inpatient (BNP)   Result Value Ref Range    N terminal Pro BNP Inpatient 95 0 - 450 pg/mL   CBC with platelets and differential   Result Value Ref Range    WBC Count 6.0 4.0 - 11.0 10e3/uL    RBC Count 3.26 (L) 3.80 - 5.20 10e6/uL    Hemoglobin 10.3 (L) 11.7 - 15.7 g/dL    Hematocrit 30.2 (L) 35.0 - 47.0 %    MCV 93 78 - 100 fL    MCH 31.6 26.5 - 33.0 pg    MCHC 34.1 31.5 - 36.5 g/dL    RDW 13.4 10.0 - 15.0 %    Platelet Count 138 (L) 150 - 450 10e3/uL    % Neutrophils 92 %    % Lymphocytes 6 %    % Monocytes 2 %    % Eosinophils 0 %    % Basophils 0 %    % Immature Granulocytes 0 %    NRBCs per 100 WBC 0 <1 /100    Absolute Neutrophils 5.4 1.6 - 8.3 10e3/uL    Absolute Lymphocytes 0.4 (L) 0.8 - 5.3 10e3/uL    Absolute Monocytes 0.1 0.0 - 1.3 10e3/uL    Absolute Eosinophils 0.0 0.0 - 0.7 10e3/uL    Absolute Basophils 0.0 0.0 - 0.2 10e3/uL    Absolute Immature Granulocytes 0.0 <=0.4 10e3/uL    Absolute NRBCs 0.0 10e3/uL   iStat Gases (lactate) venous, POCT   Result Value Ref Range    Lactic Acid POCT 2.0 <=2.0 mmol/L    Bicarbonate Venous POCT 19 (L) 21 - 28 mmol/L    O2 Sat, Venous POCT 98 94 - 100 %    pCO2V Venous POCT 26 (L) 40 - 50 mm Hg    pH Venous POCT 7.46 (H) 7.32 - 7.43    pO2 Venous POCT 98 (H) 25 - 47 mm Hg   XR Chest 2 Views    Narrative    EXAM: XR CHEST 2 VIEWS  LOCATION: [facility]  DATE/TIME: 1/22/2023 8:29 PM    INDICATION: body aches, shortness of breath  COMPARISON: None.      Impression    IMPRESSION: Negative chest.   UA with Microscopic  reflex to Culture    Specimen: Urine, Midstream   Result Value Ref Range    Color Urine Light Yellow Colorless, Straw, Light Yellow, Yellow    Appearance Urine Clear Clear    Glucose Urine Negative Negative mg/dL    Bilirubin Urine Negative Negative    Ketones Urine Negative Negative mg/dL    Specific Gravity Urine 1.007 1.003 - 1.035    Blood Urine Negative Negative    pH Urine 7.0 5.0 - 7.0    Protein Albumin Urine Negative Negative mg/dL    Urobilinogen Urine Normal Normal, 2.0 mg/dL    Nitrite Urine Negative Negative    Leukocyte Esterase Urine Moderate (A) Negative    Bacteria Urine Moderate (A) None Seen /HPF    Mucus Urine Present (A) None Seen /LPF    RBC Urine 2 <=2 /HPF    WBC Urine 9 (H) <=5 /HPF    Squamous Epithelials Urine 9 (H) <=1 /HPF    Narrative    Urine Culture ordered based on laboratory criteria       A&P: Wilfred Barros is a 33 year old  at 20w2d by LMP presenting to the ED with headache and generalized fatigue. Pregnancy c/b hyperemesis gravidarum, hx CS x1 with , hx  birth, anemia, AMA, anxiety/depression. Pt receives PNC at [facility]. Evaluation in the ED notable for initial hypotension and tachycardia responsive to fluids. Lab workup unremarkable with normal WBC, stable Hgb, UA neg. Respiratory viral panel collected and negative, CXR negative. Headache improved with fluids and PO medications. Given no localizing symptoms or signs of infection, suspect mild viral illness (sick contact at home) vs significant dehydration in the setting of decreased intake today. Safe for discharge home from OB perspective with close outpatient follow up with primary OB provider. Discussed return precautions.    Discussed with Dr. Scottie Monaco MD  ObGyn, PGY-3  2023 9:52 PM

## 2023-01-24 PROBLEM — R82.71 GBS BACTERIURIA: Status: ACTIVE | Noted: 2023-01-24

## 2023-01-24 LAB — BACTERIA UR CULT: NORMAL

## 2023-01-25 LAB
BACTERIA UR CULT: ABNORMAL
BACTERIA UR CULT: ABNORMAL

## 2023-01-25 NOTE — RESULT ENCOUNTER NOTE
Final urine culture report is negative.  Adult Negative Urine culture parameters per protocol: Any # Urogenital single or mixed organism, <10,000 col/ml single organism (cath/midstream), and > 3 organisms (No susceptibilities performed).  [facility] Emergency Dept discharge antibiotic prescribed (If applicable): None  Treatment recommendations per [facility] ED Lab Result Urine Culture protocol.

## 2023-01-27 LAB
BACTERIA BLD CULT: NO GROWTH
BACTERIA BLD CULT: NO GROWTH

## 2023-04-27 ENCOUNTER — HOSPITAL ENCOUNTER (EMERGENCY)
Facility: CLINIC | Age: 33
Discharge: HOME | End: 2023-04-27
Attending: EMERGENCY MEDICINE | Admitting: EMERGENCY MEDICINE
Payer: COMMERCIAL

## 2023-04-27 VITALS
DIASTOLIC BLOOD PRESSURE: 63 MMHG | HEART RATE: 90 BPM | RESPIRATION RATE: 21 BRPM | BODY MASS INDEX: 24.59 KG/M2 | OXYGEN SATURATION: 98 % | WEIGHT: 153 LBS | HEIGHT: 66 IN | SYSTOLIC BLOOD PRESSURE: 101 MMHG | TEMPERATURE: 97.8 F

## 2023-04-27 DIAGNOSIS — U07.1 INFECTION DUE TO 2019 NOVEL CORONAVIRUS: ICD-10-CM

## 2023-04-27 LAB
DEPRECATED S PYO AG THROAT QL EIA: NEGATIVE
FLUAV RNA SPEC QL NAA+PROBE: NEGATIVE
FLUBV RNA RESP QL NAA+PROBE: NEGATIVE
GROUP A STREP BY PCR: DETECTED
RSV RNA SPEC NAA+PROBE: NEGATIVE
SARS-COV-2 RNA RESP QL NAA+PROBE: POSITIVE

## 2023-04-27 PROCEDURE — 87651 STREP A DNA AMP PROBE: CPT | Performed by: FAMILY MEDICINE

## 2023-04-27 PROCEDURE — 87637 SARSCOV2&INF A&B&RSV AMP PRB: CPT | Performed by: FAMILY MEDICINE

## 2023-04-27 PROCEDURE — 250N000013 HC RX MED GY IP 250 OP 250 PS 637: Performed by: EMERGENCY MEDICINE

## 2023-04-27 PROCEDURE — 99283 EMERGENCY DEPT VISIT LOW MDM: CPT | Mod: CS | Performed by: EMERGENCY MEDICINE

## 2023-04-27 PROCEDURE — C9803 HOPD COVID-19 SPEC COLLECT: HCPCS | Performed by: EMERGENCY MEDICINE

## 2023-04-27 PROCEDURE — 99284 EMERGENCY DEPT VISIT MOD MDM: CPT | Mod: CS | Performed by: EMERGENCY MEDICINE

## 2023-04-27 RX ORDER — ACETAMINOPHEN 325 MG/1
975 TABLET ORAL ONCE
Status: COMPLETED | OUTPATIENT
Start: 2023-04-27 | End: 2023-04-27

## 2023-04-27 RX ADMIN — ACETAMINOPHEN 975 MG: 325 TABLET ORAL at 16:48

## 2023-04-27 ASSESSMENT — ACTIVITIES OF DAILY LIVING (ADL)
ADLS_ACUITY_SCORE: 35
ADLS_ACUITY_SCORE: 35

## 2023-04-27 NOTE — DISCHARGE INSTRUCTIONS
Your COVID test returned positive  Start medications as directed, Paxlovid  Inform your OB provider about your positive status  Return if any problems or concerns particularly fevers, nausea, vomiting or difficulty breathing

## 2023-04-27 NOTE — ED PROVIDER NOTES
[facility] EMERGENCY DEPARTMENT ([facility])    4/27/23      ED PROVIDER NOTE  ED 7   History     Chief Complaint   Patient presents with     Flu Symptoms     Body aches, fever, cough, patient reports some chest discomfort with cough     Pharyngitis     Started Thursday, patient reports her kids tested positive for strep     The history is provided by the patient and medical records.     Wilfred Barros is a 33 year old female Rh- who is 8 months pregnant who comes in with a few days of viral symptoms including sore throat, nasal congestion, and mild cough.  She reports she has been taking ibuprofen for her body aches.  She has not been COVID or influenza tested.  There is no symptoms problems or complications associated with the pregnancy.  She did have a child test positive for strep.    Past Medical History  History reviewed. No pertinent past medical history.  History reviewed. No pertinent surgical history.  nirmatrelvir and ritonavir (PAXLOVID) 300 mg/100 mg therapy pack  Prenatal Vit-Fe Fumarate-FA (PRENATAL VITAMINS) 28-0.8 MG TABS  cholecalciferol (VITAMIN D3) 10 mcg (400 units) TABS tablet  norethindrone-ethinyl estradiol (MICROGESTIN 1.5/30) 1.5-30 MG-MCG tablet  ondansetron (ZOFRAN ODT) 4 MG ODT tab  paragard intrauterine copper device      Allergies   Allergen Reactions     Metronidazole Angioedema and Swelling     Also dizzy and hard to swallow  Other reaction(s): Swelling of Face,throat,lips or tongue/Angioedema - Allergy  Also dizzy and hard to swallow  Also dizzy and hard to swallow  Also dizzy and hard to swallow       Blood-Group Specific Substance Other (See Comments)     Patient has a probable passive anti-D antibody. Blood products may be delayed. Draw patient 24 hours prior to transfusion. Draw one red top and two purple top tubes for all type and screen orders.  Patient has a probable passive anti-D antibody. Blood products may be delayed. Draw patient 24 hours prior to transfusion. Draw  "one red top and two purple top tubes for all type and screen orders.       Family History  History reviewed. No pertinent family history.  Social History   Social History     Tobacco Use     Smoking status: Never     Smokeless tobacco: Never   Substance Use Topics     Alcohol use: Not Currently     Drug use: Not Currently         A medically appropriate review of systems was performed with pertinent positives and negatives noted in the HPI, and all other systems negative.    Physical Exam   BP: 98/62  Pulse: 90  Temp: 98.1  F (36.7  C)  Resp: 16  Height: 167.6 cm (5' 6\")  Weight: 69.4 kg (153 lb)  SpO2: 99 %  Physical Exam  Vitals and nursing note reviewed.   Constitutional:       General: She is not in acute distress.     Appearance: She is well-developed.   HENT:      Head: Normocephalic and atraumatic.      Mouth/Throat:      Mouth: Mucous membranes are moist.   Eyes:      General: No scleral icterus.     Conjunctiva/sclera: Conjunctivae normal.      Pupils: Pupils are equal, round, and reactive to light.   Cardiovascular:      Rate and Rhythm: Normal rate and regular rhythm.      Heart sounds: Normal heart sounds.   Pulmonary:      Effort: Pulmonary effort is normal. No respiratory distress.      Breath sounds: Normal breath sounds. No wheezing.   Abdominal:      General: Abdomen is flat.      Palpations: Abdomen is soft.      Comments: Gravid, nontender   Musculoskeletal:      Cervical back: Neck supple.   Skin:     General: Skin is warm and dry.   Neurological:      General: No focal deficit present.      Mental Status: She is alert and oriented to person, place, and time.      Cranial Nerves: No cranial nerve deficit.   Psychiatric:         Mood and Affect: Mood normal.         Behavior: Behavior normal.           ED Course, Procedures, & Data      Procedures                Results for orders placed or performed during the hospital encounter of 04/27/23   Symptomatic Influenza A/B, RSV, & SARS-CoV2 PCR " (COVID-19) Nasopharyngeal     Status: Abnormal    Specimen: Nasopharyngeal; Swab   Result Value Ref Range    Influenza A PCR Negative Negative    Influenza B PCR Negative Negative    RSV PCR Negative Negative    SARS CoV2 PCR Positive (A) Negative    Narrative    Testing was performed using the Xpert Xpress CoV2/Flu/RSV Assay on the [employer] Instrument. This test should be ordered for the detection of SARS-CoV-2, influenza, and RSV viruses in individuals who meet clinical and/or epidemiological criteria. Test performance is unknown in asymptomatic patients. This test is for in vitro diagnostic use under the FDA EUA for laboratories certified under CLIA to perform high or moderate complexity testing. This test has not been FDA cleared or approved. A negative result does not rule out the presence of PCR inhibitors in the specimen or target RNA in concentration below the limit of detection for the assay. If only one viral target is positive but coinfection with multiple targets is suspected, the sample should be re-tested with another FDA cleared, approved, or authorized test, if coinfection would change clinical management. This test was validated by the [facility]. These laboratories are certified under the Clinical Laboratory Improvement Amendments of 1988 (CLIA-88) as qualified to perform high complexity laboratory testing.   Streptococcus A Rapid Scr w Reflx to PCR     Status: Normal    Specimen: Throat; Swab   Result Value Ref Range    Group A Strep antigen Negative Negative   Group A Streptococcus PCR Throat Swab     Status: Abnormal    Specimen: Throat; Swab   Result Value Ref Range    Group A strep by PCR Detected (A) Not Detected    Narrative    The Xpert Xpress Strep A test, performed on the [employer]  Instrument Systems, is a rapid, qualitative in vitro diagnostic test for the detection of Streptococcus pyogenes (Group A ß-hemolytic Streptococcus, Strep A) in throat swab  specimens from patients with signs and symptoms of pharyngitis. The Xpert Xpress Strep A test can be used as an aid in the diagnosis of Group A Streptococcal pharyngitis. The assay is not intended to monitor treatment for Group A Streptococcus infections. The Xpert Xpress Strep A test utilizes an automated real-time polymerase chain reaction (PCR) to detect Streptococcus pyogenes DNA.     Medications   nirmatrelvir and ritonavir (PAXLOVID) 300 mg/100 mg therapy pack 3 tablet (has no administration in time range)   acetaminophen (TYLENOL) tablet 975 mg (975 mg Oral $Given 4/27/23 3688)     Labs Ordered and Resulted from Time of ED Arrival to Time of ED Departure   INFLUENZA A/B, RSV, & SARS-COV2 PCR - Abnormal       Result Value    Influenza A PCR Negative      Influenza B PCR Negative      RSV PCR Negative      SARS CoV2 PCR Positive (*)    GROUP A STREPTOCOCCUS PCR THROAT SWAB - Abnormal    Group A strep by PCR Detected (*)    STREPTOCOCCUS A RAPID SCREEN W REFELX TO PCR - Normal    Group A Strep antigen Negative       No orders to display          Critical care was not performed.     Medical Decision Making  The patient's presentation was of moderate complexity (an acute illness with systemic symptoms).    The patient's evaluation involved:  ordering and/or review of 3+ test(s) in this encounter (COVID-19, influenza, rapid streptococcal test)    The patient's management necessitated moderate risk (prescription drug management including medications given in the ED).      Assessment & Plan    43-year-old female who is 8 months pregnant with uncomplicated course.  She presents with classic viral symptoms of fatigue sore throat cough congestion.  Her COVID has returned positive.  She is not febrile she has no abdominal pain cramps or bleeding she is not hypoxic.  The case was discussed with OB as well as the pharmacist will treat with Paxlovid.  She has normal renal function and she has no contraindications.  She will  be given a prescription and encouraged to fill this tonight she also needs to inform her OB provider tomorrow about this.  At this point there is no indication for hospitalization.  She asked about cough medication and I explained that in her pregnancy that would not be advisable.  Again she has no complications that warrant further treatment or monitoring.    I have reviewed the nursing notes. I have reviewed the findings, diagnosis, plan and need for follow up with the patient.    New Prescriptions    NIRMATRELVIR AND RITONAVIR (PAXLOVID) 300 MG/100 MG THERAPY PACK    Take 3 tablets by mouth 2 times daily for 5 days       Final diagnoses:   Infection due to 2019 novel coronavirus       Dashawn Skaggs MD  [facility] EMERGENCY DEPARTMENT  4/27/2023     Dashawn Skaggs MD  04/27/23 1932

## 2023-04-27 NOTE — ED TRIAGE NOTES
Patient is 8 mos pregnant. Patient denies any abdominal pain.      Triage Assessment     Row Name 04/27/23 1450       Triage Assessment (Adult)    Airway WDL WDL       Respiratory WDL    Respiratory WDL cough    Cough Frequency infrequent    Cough Type dry       Skin Circulation/Temperature WDL    Skin Circulation/Temperature WDL WDL       Cardiac WDL    Cardiac WDL WDL

## 2023-04-28 ENCOUNTER — PATIENT OUTREACH (OUTPATIENT)
Dept: CARE COORDINATION | Facility: CLINIC | Age: 33
End: 2023-04-28
Payer: COMMERCIAL

## 2023-04-28 NOTE — PROGRESS NOTES
Clinic Care Coordination Contact    Referral Type: Virtual Home Monitoring - GetWell Loop Program    Clinical Data: Patient referred to ambulatory care coordination by OB provider due to COVID-19 diagnosis on 4/27/2023.  Estimated Date of Delivery: Data Unavailable or Delivery Date:  No data found     Patient states she is doing good today.    Symptoms:     Cough -  Occasional- here and there per patient report   Shortness of breath:  shortness of breath No change since Emergency Room visit. Notices the shortness of breath only occasionally.   Chest pain:  Yes: Denies any changes since yesterday, only notices it with coughing.   Fever: No  Headache:  No  Sore throat:  Yes- same as yesterday  Nasal congestion:  Yes  Nausea/vomiting/diarrhea:  No  Body aches/joint pains:  Yes  Fatigue:  No    Home treatment measures used and outcome:  Only what was given in the hospital.     Medications:  Do you have any questions about your medications? Yes  Patient has questions on the safety of taking medication while pregnant. RN recommended patient talk to pharmacist regarding questions.  Patient's  will be picking up the medication and will have him ask the questions.     Pulse Oximeter Questions:    Do you have a pulse oximeter at home?  Yes    If Yes:   Are you currently utilizing the pulse oximeter?  Yes Oxygen level currently 99%  Do you understand how to use the home oximeter?  Yes      Follow Up:    Do you have a follow up appointment scheduled with your PCP or specialist?  No- Patient denies any current appointments but will call her primary clinic to set appointment up.   Do you have a plan in place in the event of an emergency?  Yes  If patient is established or planning to establish primary care within [facility], Care Coordination was offered. Care Coordination accepted/declined:  No- Patient goes to [facility] for her care  GetWell Loop invitation received?  Yes  GetWell Loop invitation accepted,  pending patient activation or declined:  Declined     Patient has 24/7 nurse line information to call if she has questions, concerns or if she has any new or worsening symptoms.     Jessica Vieyra RN  [facility] - RN Care Coordinator

## 2023-04-28 NOTE — ED NOTES
"Pt given \"home virtual monitoring and COVID-19 packet and patient pulse oximeter as well as instructions.  "

## 2023-06-24 DIAGNOSIS — B37.2 YEAST INFECTION OF THE SKIN: Primary | ICD-10-CM

## 2023-06-24 RX ORDER — CLOTRIMAZOLE 1 %
CREAM (GRAM) TOPICAL 2 TIMES DAILY
Qty: 15 G | Refills: 1 | Status: SHIPPED | OUTPATIENT
Start: 2023-06-24

## 2025-01-13 ENCOUNTER — OFFICE VISIT (OUTPATIENT)
Dept: URGENT CARE | Facility: URGENT CARE | Age: 35
End: 2025-01-13
Payer: COMMERCIAL

## 2025-01-13 ENCOUNTER — ANCILLARY PROCEDURE (OUTPATIENT)
Dept: GENERAL RADIOLOGY | Facility: CLINIC | Age: 35
End: 2025-01-13
Attending: PHYSICIAN ASSISTANT
Payer: COMMERCIAL

## 2025-01-13 VITALS
TEMPERATURE: 96.8 F | SYSTOLIC BLOOD PRESSURE: 102 MMHG | RESPIRATION RATE: 18 BRPM | HEIGHT: 66 IN | OXYGEN SATURATION: 100 % | DIASTOLIC BLOOD PRESSURE: 73 MMHG | WEIGHT: 168 LBS | BODY MASS INDEX: 27 KG/M2 | HEART RATE: 52 BPM

## 2025-01-13 DIAGNOSIS — J01.90 ACUTE SINUSITIS WITH SYMPTOMS > 10 DAYS: Primary | ICD-10-CM

## 2025-01-13 DIAGNOSIS — R07.89 CHEST DISCOMFORT: ICD-10-CM

## 2025-01-13 PROCEDURE — 71046 X-RAY EXAM CHEST 2 VIEWS: CPT | Mod: TC | Performed by: RADIOLOGY

## 2025-01-13 PROCEDURE — 99204 OFFICE O/P NEW MOD 45 MIN: CPT | Performed by: PHYSICIAN ASSISTANT

## 2025-01-13 RX ORDER — FLUTICASONE PROPIONATE 50 MCG
2 SPRAY, SUSPENSION (ML) NASAL DAILY
Qty: 18.2 ML | Refills: 0 | Status: SHIPPED | OUTPATIENT
Start: 2025-01-13

## 2025-01-13 RX ORDER — IBUPROFEN 200 MG
200 TABLET ORAL EVERY 4 HOURS PRN
COMMUNITY

## 2025-01-13 RX ORDER — CEFUROXIME AXETIL 500 MG/1
500 TABLET ORAL 2 TIMES DAILY
Qty: 20 TABLET | Refills: 0 | Status: SHIPPED | OUTPATIENT
Start: 2025-01-13 | End: 2025-01-23

## 2025-01-13 RX ORDER — ACETAMINOPHEN 325 MG/1
325-650 TABLET ORAL EVERY 6 HOURS PRN
COMMUNITY

## 2025-01-13 RX ORDER — PREDNISONE 20 MG/1
TABLET ORAL
Qty: 5 TABLET | Refills: 0 | Status: SHIPPED | OUTPATIENT
Start: 2025-01-13

## 2025-01-13 ASSESSMENT — PAIN SCALES - GENERAL: PAINLEVEL_OUTOF10: SEVERE PAIN (7)

## 2025-01-13 NOTE — PROGRESS NOTES
"Patient presents with:  Urgent Care: Pt  in clinic to have eval for left side chest discomfort with coughing and lying.  Facial Pain: Pt is also c/o left side facial pain.    (J01.90) Acute sinusitis with symptoms > 10 days  (primary encounter diagnosis)  Comment:   Plan: fluticasone (FLONASE) 50 MCG/ACT nasal spray,         predniSONE (DELTASONE) 20 MG tablet, cefuroxime        (CEFTIN) 500 MG tablet  Use the Flonase nasal spray at bedtime every night for 2-3 weeks.      Ceftin twice a day for 10 days.     Prednisone every morning for 5 days            (R07.89) Chest discomfort  Comment:   Plan: XR Chest 2 Views, predniSONE (DELTASONE) 20 MG         tablet            Saline nasal spray.    \"Ocean mist\" use every morning to flush nasal passages      At the end of the encounter, I discussed results, diagnosis, medications. Discussed red flags for immediate return to clinic/ER, as well as indications for follow up if no improvement. Patient understood and agreed to plan. Patient was stable for discharge     If not improving or if condition worsens, follow up with your Primary Care Provider      SUBJECTIVE:   Wilfred Barros is a 35 year old female who presents today with cough for the past couple of weeks after her child had RSV, she presumes she had the same.  Has had intermittent low-grade fevers.  Cough is worse when she lays down at night.  Also having trouble breathing through her left nostril and complains of left sided facial/sinus pain.  Left-sided chest discomfort with coughing.  Denies shortness of breath or wheezing.    She is here today with her  who helps with the HPI      Patient Active Problem List   Diagnosis    GBS bacteriuria         No past medical history on file.      Current Outpatient Medications   Medication Sig Dispense Refill    Multiple Vitamins-Iron (DAILY-PHILIP/IRON/BETA-CAROTENE) TABS TAKE 1 TABLET BY MOUTH DAILY. (Patient not taking: Reported on 10/19/2020) 30 tablet 7     Social " "History     Tobacco Use    Smoking status: Never Smoker    Smokeless tobacco: Never Used   Substance Use Topics    Alcohol use: Not on file     Family History   Problem Relation Age of Onset    Diabetes Mother     Diabetes Father          ROS:    10 point ROS of systems including Constitutional, Eyes, Respiratory, Cardiovascular, Gastroenterology, Genitourinary, Integumentary, Muscularskeletal, Psychiatric ,neurological were all negative except for pertinent positives noted in my HPI       OBJECTIVE:  /73   Pulse 52   Temp 96.8  F (36  C) (Temporal)   Resp 18   Ht 1.676 m (5' 6\")   Wt 76.2 kg (168 lb)   LMP 12/25/2024   SpO2 100%   BMI 27.12 kg/m    Physical Exam:  GENERAL APPEARANCE: healthy, alert and no distress  EYES: EOMI,  PERRL, conjunctiva clear  HENT: ear canals and TM's normal.  Nose with turbinate hypertrophy on the left nearly occluding the nasal passage, purulent drainage.  And mouth without ulcers, erythema or lesions  NECK: supple, nontender, no lymphadenopathy  RESP: lungs clear to auscultation - no rales, rhonchi or wheezes  CV: regular rates and rhythm, normal S1 S2, no murmur noted  NEURO: Normal strength and tone, sensory exam grossly normal,  normal speech and mentation  SKIN: no suspicious lesions or rashes    X-Ray was done, my findings are: No infiltrates or masses    "

## 2025-01-14 NOTE — PATIENT INSTRUCTIONS
"(J01.90) Acute sinusitis with symptoms > 10 days  (primary encounter diagnosis)  Comment:   Plan: fluticasone (FLONASE) 50 MCG/ACT nasal spray,         predniSONE (DELTASONE) 20 MG tablet, cefuroxime        (CEFTIN) 500 MG tablet  Use the Flonase nasal spray at bedtime every night for 2-3 weeks.      Ceftin twice a day for 10 days.     Prednisone every morning for 5 days            (R07.89) Chest discomfort  Comment:   Plan: XR Chest 2 Views, predniSONE (DELTASONE) 20 MG         tablet            Saline nasal spray.    \"Ocean mist\" use every morning to flush nasal passages          "

## 2025-08-21 ENCOUNTER — OFFICE VISIT (OUTPATIENT)
Dept: URGENT CARE | Facility: URGENT CARE | Age: 35
End: 2025-08-21
Payer: COMMERCIAL

## 2025-08-21 VITALS
TEMPERATURE: 96.6 F | SYSTOLIC BLOOD PRESSURE: 94 MMHG | DIASTOLIC BLOOD PRESSURE: 59 MMHG | BODY MASS INDEX: 26.2 KG/M2 | OXYGEN SATURATION: 96 % | WEIGHT: 163 LBS | HEART RATE: 78 BPM | RESPIRATION RATE: 18 BRPM | HEIGHT: 66 IN

## 2025-08-21 DIAGNOSIS — R10.9 FLANK PAIN: Primary | ICD-10-CM

## 2025-08-21 DIAGNOSIS — R10.84 ABDOMINAL PAIN, GENERALIZED: ICD-10-CM

## 2025-08-21 DIAGNOSIS — N89.8 VAGINAL DISCHARGE: ICD-10-CM

## 2025-08-21 LAB
ALBUMIN UR-MCNC: NEGATIVE MG/DL
APPEARANCE UR: CLEAR
BILIRUB UR QL STRIP: NEGATIVE
CLUE CELLS: ABNORMAL
COLOR UR AUTO: YELLOW
GLUCOSE UR STRIP-MCNC: NEGATIVE MG/DL
HGB UR QL STRIP: ABNORMAL
KETONES UR STRIP-MCNC: NEGATIVE MG/DL
LEUKOCYTE ESTERASE UR QL STRIP: NEGATIVE
NITRATE UR QL: NEGATIVE
PH UR STRIP: 6 [PH] (ref 5–7)
RBC #/AREA URNS AUTO: NORMAL /HPF
SP GR UR STRIP: 1.02 (ref 1–1.03)
TRICHOMONAS, WET PREP: ABNORMAL
UROBILINOGEN UR STRIP-ACNC: 0.2 E.U./DL
WBC #/AREA URNS AUTO: NORMAL /HPF
WBC'S/HIGH POWER FIELD, WET PREP: ABNORMAL
YEAST, WET PREP: ABNORMAL

## 2025-08-21 RX ORDER — SIMETHICONE 125 MG
125 TABLET,CHEWABLE ORAL 2 TIMES DAILY
Qty: 10 TABLET | Refills: 0 | Status: SHIPPED | OUTPATIENT
Start: 2025-08-21

## 2025-08-21 RX ORDER — FAMOTIDINE 40 MG/1
40 TABLET, FILM COATED ORAL DAILY
Qty: 10 TABLET | Refills: 0 | Status: SHIPPED | OUTPATIENT
Start: 2025-08-21 | End: 2025-08-31